# Patient Record
Sex: FEMALE | Race: WHITE | NOT HISPANIC OR LATINO | Employment: FULL TIME | URBAN - METROPOLITAN AREA
[De-identification: names, ages, dates, MRNs, and addresses within clinical notes are randomized per-mention and may not be internally consistent; named-entity substitution may affect disease eponyms.]

---

## 2021-03-23 ENCOUNTER — OFFICE VISIT (OUTPATIENT)
Dept: FAMILY MEDICINE CLINIC | Facility: CLINIC | Age: 36
End: 2021-03-23
Payer: COMMERCIAL

## 2021-03-23 VITALS
HEIGHT: 63 IN | SYSTOLIC BLOOD PRESSURE: 130 MMHG | WEIGHT: 122 LBS | RESPIRATION RATE: 18 BRPM | TEMPERATURE: 96.8 F | HEART RATE: 90 BPM | BODY MASS INDEX: 21.62 KG/M2 | OXYGEN SATURATION: 99 % | DIASTOLIC BLOOD PRESSURE: 76 MMHG

## 2021-03-23 DIAGNOSIS — Z13.6 SCREENING FOR CARDIOVASCULAR, RESPIRATORY, AND GENITOURINARY DISEASES: ICD-10-CM

## 2021-03-23 DIAGNOSIS — Z13.29 SCREENING FOR THYROID DISORDER: ICD-10-CM

## 2021-03-23 DIAGNOSIS — Z13.89 SCREENING FOR CARDIOVASCULAR, RESPIRATORY, AND GENITOURINARY DISEASES: ICD-10-CM

## 2021-03-23 DIAGNOSIS — Z13.83 SCREENING FOR CARDIOVASCULAR, RESPIRATORY, AND GENITOURINARY DISEASES: ICD-10-CM

## 2021-03-23 DIAGNOSIS — Z23 NEED FOR VACCINATION: ICD-10-CM

## 2021-03-23 DIAGNOSIS — M25.531 RIGHT WRIST PAIN: ICD-10-CM

## 2021-03-23 DIAGNOSIS — Z00.00 WELL ADULT EXAM: Primary | ICD-10-CM

## 2021-03-23 DIAGNOSIS — Z11.4 SCREENING FOR HIV (HUMAN IMMUNODEFICIENCY VIRUS): ICD-10-CM

## 2021-03-23 PROBLEM — F41.8 DEPRESSION WITH ANXIETY: Status: ACTIVE | Noted: 2021-03-23

## 2021-03-23 PROBLEM — G43.909 MIGRAINES: Status: ACTIVE | Noted: 2021-03-23

## 2021-03-23 PROCEDURE — 99385 PREV VISIT NEW AGE 18-39: CPT | Performed by: FAMILY MEDICINE

## 2021-03-23 PROCEDURE — 3725F SCREEN DEPRESSION PERFORMED: CPT | Performed by: FAMILY MEDICINE

## 2021-03-23 RX ORDER — LAMOTRIGINE 100 MG/1
TABLET ORAL DAILY
COMMUNITY
Start: 2021-03-22 | End: 2021-10-26 | Stop reason: ALTCHOICE

## 2021-03-23 RX ORDER — SUMATRIPTAN 50 MG/1
TABLET, FILM COATED ORAL AS NEEDED
COMMUNITY
Start: 2021-03-22 | End: 2021-09-28 | Stop reason: SDUPTHER

## 2021-03-23 NOTE — PROGRESS NOTES
FAMILY PRACTICE HEALTH MAINTENANCE OFFICE VISIT  [facility]    NAME: Sarah Perez  AGE: 30 y o  SEX: female  : 1985     DATE: 3/23/2021    Assessment and Plan     1  Well adult exam    2  Screening for cardiovascular, respiratory, and genitourinary diseases  -     CBC; Future  -     Comprehensive metabolic panel; Future  -     Lipid Panel with Direct LDL reflex; Future    3  Screening for thyroid disorder  -     TSH, 3rd generation; Future  -     T4, free; Future    4  Need for vaccination    5  Screening for HIV (human immunodeficiency virus)  -     HIV 1/2 w/ Reflex (Multispot); Future    6  Right wrist pain  -     Ambulatory referral to Orthopedic Surgery; Future        · Patient Counseling:   · Nutrition: Stressed importance of a well balanced diet, moderation of sodium/saturated fat, caloric balance and sufficient intake of fiber  · Exercise: Stressed the importance of regular exercise with a goal of 150 minutes per week  · Dental Health: Discussed daily flossing and brushing and regular dental visits     · Immunizations reviewed: Up To Date  · Discussed benefits of:  Screening labs   BMI Counseling: Body mass index is 21 61 kg/m²  Discussed with patient's BMI with her  The BMI is normal      No follow-ups on file  Chief Complaint     Chief Complaint   Patient presents with    Physical Exam     [id number]       History of Present Illness     HPI    Well Adult Physical   Patient here for a comprehensive physical exam   She complains of right sided wrist discomfort and numbness in her palm  Worked as a  for years so thinks it may be due to repetitive motion  Has tried OTC NSAIDs and a brace on her wrist  Would like ortho evaluation         Diet and Physical Activity  Diet: well balanced diet  Exercise: frequently      Depression Screen  PHQ-9 Depression Screening    PHQ-9:   Frequency of the following problems over the past two weeks:      Little interest or pleasure in doing things: 0 - not at all  Feeling down, depressed, or hopeless: 0 - not at all  PHQ-2 Score: 0          General Health  Hearing: Normal:  bilateral  Vision: no vision problems  Dental: regular dental visits, brushes teeth twice daily and flosses teeth occasionally    Reproductive Health  No issues , Regular Periods and Follows with gynecologist      The following portions of the patient's history were reviewed and updated as appropriate: allergies, current medications, past family history, past medical history, past social history, past surgical history and problem list     Review of Systems     Review of Systems   Constitutional: Negative  HENT: Negative  Eyes: Negative  Respiratory: Negative  Cardiovascular: Negative  Gastrointestinal: Negative  Endocrine: Negative  Genitourinary: Negative  Musculoskeletal: Negative  Skin: Negative  Allergic/Immunologic: Negative  Neurological: Negative  Hematological: Negative  Psychiatric/Behavioral: Negative  Past Medical History     No past medical history on file  Past Surgical History     No past surgical history on file      Social History     Social History     Socioeconomic History    Marital status: /Civil Union     Spouse name: None    Number of children: None    Years of education: None    Highest education level: None   Occupational History    None   Social Needs    Financial resource strain: None    Food insecurity     Worry: None     Inability: None    Transportation needs     Medical: None     Non-medical: None   Tobacco Use    Smoking status: Never Smoker    Smokeless tobacco: Never Used   Substance and Sexual Activity    Alcohol use: Not Currently    Drug use: Never    Sexual activity: None   Lifestyle    Physical activity     Days per week: None     Minutes per session: None    Stress: None   Relationships    Social connections     Talks on phone: None     Gets together: None     Attends Orthodoxy service: None     Active member of club or organization: None     Attends meetings of clubs or organizations: None     Relationship status: None    Intimate partner violence     Fear of current or ex partner: None     Emotionally abused: None     Physically abused: None     Forced sexual activity: None   Other Topics Concern    None   Social History Narrative    None       Family History     No family history on file  Current Medications       Current Outpatient Medications:     lamoTRIgine (LaMICtal) 100 mg tablet, daily, Disp: , Rfl:     SUMAtriptan (IMITREX) 50 mg tablet, as needed, Disp: , Rfl:      Allergies     No Known Allergies    Objective     /76   Pulse 90   Temp (!) 96 8 °F (36 °C)   Resp 18   Ht 5' 3" (1 6 m)   Wt 55 3 kg (122 lb)   LMP 03/09/2021 (Approximate)   SpO2 99%   BMI 21 61 kg/m²      Physical Exam  Constitutional:       General: She is not in acute distress  Appearance: She is well-developed  She is not diaphoretic  HENT:      Head: Normocephalic and atraumatic  Neck:      Musculoskeletal: Normal range of motion and neck supple  Cardiovascular:      Rate and Rhythm: Normal rate and regular rhythm  Heart sounds: Normal heart sounds  No murmur  No friction rub  No gallop  Pulmonary:      Effort: Pulmonary effort is normal  No respiratory distress  Breath sounds: Normal breath sounds  No wheezing or rales  Chest:      Chest wall: No tenderness  Abdominal:      General: Bowel sounds are normal  There is no distension  Palpations: Abdomen is soft  There is no mass  Tenderness: There is no abdominal tenderness  There is no guarding or rebound  Musculoskeletal: Normal range of motion  General: No deformity  Skin:     General: Skin is warm and dry  Neurological:      Mental Status: She is alert and oriented to person, place, and time     Psychiatric:         Behavior: Behavior normal  Thought Content:  Thought content normal          Judgment: Judgment normal             Visual Acuity Screening    Right eye Left eye Both eyes   Without correction:      With correction: 20/15 20/15 20/15           Carline Arias MD  [facility]

## 2021-03-25 ENCOUNTER — OFFICE VISIT (OUTPATIENT)
Dept: URGENT CARE | Facility: CLINIC | Age: 36
End: 2021-03-25
Payer: COMMERCIAL

## 2021-03-25 ENCOUNTER — APPOINTMENT (OUTPATIENT)
Dept: RADIOLOGY | Facility: CLINIC | Age: 36
End: 2021-03-25
Payer: COMMERCIAL

## 2021-03-25 VITALS — HEART RATE: 80 BPM | OXYGEN SATURATION: 100 % | RESPIRATION RATE: 16 BRPM | TEMPERATURE: 98.1 F

## 2021-03-25 DIAGNOSIS — M25.531 WRIST PAIN, ACUTE, RIGHT: Primary | ICD-10-CM

## 2021-03-25 DIAGNOSIS — R52 PAIN: ICD-10-CM

## 2021-03-25 PROCEDURE — 99213 OFFICE O/P EST LOW 20 MIN: CPT | Performed by: PHYSICIAN ASSISTANT

## 2021-03-25 PROCEDURE — 73110 X-RAY EXAM OF WRIST: CPT

## 2021-03-25 PROCEDURE — 73120 X-RAY EXAM OF HAND: CPT

## 2021-03-25 RX ORDER — NAPROXEN 500 MG/1
500 TABLET ORAL 2 TIMES DAILY WITH MEALS
Qty: 30 TABLET | Refills: 0 | Status: ON HOLD | OUTPATIENT
Start: 2021-03-25 | End: 2021-06-24 | Stop reason: ALTCHOICE

## 2021-03-25 NOTE — PATIENT INSTRUCTIONS
Acute R wrist pain:   -There is no obvious sign of dislocation or fracture on X-ray  Awaiting official read     -Wrist splint for comfort and support   -Ice the area for 20 minutes 3-4 times a day  -Elevate the area and rest   -You can take Advil or Tylenol for the pain  -Avoid strenuous activity/sports or gym until your symptoms improve  -Follow up with Dr Grande For further evaluation and management as scheduled

## 2021-03-25 NOTE — PROGRESS NOTES
[facility]        NAME: Rikki Anton is a 30 y o  female  : 1985    MRN: [id number]  DATE: 2021  TIME: 4:00PM    Assessment and Plan   Wrist pain, acute, right [M25 531]  1  Wrist pain, acute, right  naproxen (NAPROSYN) 500 mg tablet   2  Pain  XR wrist 3+ vw right    XR hand 2 vw right         Patient Instructions   Acute R wrist pain:   -There is no obvious sign of dislocation or fracture on X-ray  Awaiting official read  -Wrist splint for comfort and support   -Ice the area for 20 minutes 3-4 times a day  -Elevate the area and rest   -You can take Advil or Tylenol for the pain  -Avoid strenuous activity/sports or gym until your symptoms improve  -Follow up with Dr Grande For further evaluation and management as scheduled     Follow up with PCP in 3-5 days  Proceed to  ER if symptoms worsen  Chief Complaint     Chief Complaint   Patient presents with    Pain     pt presnts with right wrist/hand pain of unknown etiology; pain started one week ago         History of Present Illness       The patient presents today for a one week hx of R wrist and hand pain  She states that one week ago she woke up with acute pain of the R wrist  She states that the pain is so severe she has trouble sleeping at night  The pain ranges from 5/10-7/10 in intensity  The pain is over the lateral and medial side of the wrist  She states that the pain is a sharp/shooting pain that is non-radiating  She has full ROM of the wrist but states that there is pain with gripping  She has been taking Ibuprofen with no relief  She notes that warm compress eases the pain  She is seeing a Hand Orthopedist next week  She states that she has a hx of R wrist fracture  No acute injury or trauma  Review of Systems   Review of Systems   Constitutional: Negative for activity change, appetite change, chills, fatigue and fever  Musculoskeletal: Positive for arthralgias   Negative for back pain, gait problem, joint swelling, myalgias, neck pain and neck stiffness  Skin: Negative for color change, pallor, rash and wound  Allergic/Immunologic: Negative for immunocompromised state  Neurological: Negative for dizziness, weakness, light-headedness and numbness  Hematological: Does not bruise/bleed easily  Current Medications       Current Outpatient Medications:     lamoTRIgine (LaMICtal) 100 mg tablet, daily, Disp: , Rfl:     SUMAtriptan (IMITREX) 50 mg tablet, as needed, Disp: , Rfl:     naproxen (NAPROSYN) 500 mg tablet, Take 1 tablet (500 mg total) by mouth 2 (two) times a day with meals, Disp: 30 tablet, Rfl: 0    predniSONE 20 mg tablet, Take 1 tablet (20 mg total) by mouth 3 (three) times a day for 5 days, Disp: 15 tablet, Rfl: 0    Current Allergies     Allergies as of 03/25/2021    (No Known Allergies)            The following portions of the patient's history were reviewed and updated as appropriate: allergies, current medications, past family history, past medical history, past social history, past surgical history and problem list      Past Medical History:   Diagnosis Date    Depression     Migraines        Past Surgical History:   Procedure Laterality Date    NO PAST SURGERIES         Family History   Problem Relation Age of Onset    No Known Problems Mother     No Known Problems Father          Medications have been verified  Objective   Pulse 80   Temp 98 1 °F (36 7 °C)   Resp 16   LMP 03/09/2021 (Approximate)   SpO2 100%   Patient's last menstrual period was 03/09/2021 (approximate)  Physical Exam     Physical Exam  Vitals signs and nursing note reviewed  Constitutional:       General: She is not in acute distress  Appearance: She is well-developed  She is not diaphoretic  Cardiovascular:      Rate and Rhythm: Normal rate and regular rhythm  Heart sounds: Normal heart sounds     Pulmonary:      Effort: Pulmonary effort is normal       Breath sounds: Normal breath sounds  Musculoskeletal:      Right wrist: She exhibits tenderness  She exhibits normal range of motion, no bony tenderness, no swelling, no effusion, no crepitus and no deformity  Comments: There is mild tenderness over the diffuse right wrist  The patient has full ROM of the wrist without pain   strength is intact but she states that her pain is exacerbated with gripping  Sensation is intact  There is no erythema, edema or ecchymosis  Skin is appropriately intact and warm  Skin:     General: Skin is warm and dry  Capillary Refill: Capillary refill takes less than 2 seconds  Findings: No bruising, ecchymosis or erythema  Neurological:      General: No focal deficit present  Mental Status: She is alert and oriented to person, place, and time  Sensory: Sensation is intact  No sensory deficit  Motor: No weakness or abnormal muscle tone        Gait: Gait normal    Psychiatric:         Behavior: Behavior normal

## 2021-03-26 PROBLEM — M25.531 WRIST PAIN, ACUTE, RIGHT: Status: ACTIVE | Noted: 2021-03-26

## 2021-03-31 ENCOUNTER — OFFICE VISIT (OUTPATIENT)
Dept: OBGYN CLINIC | Facility: CLINIC | Age: 36
End: 2021-03-31
Payer: COMMERCIAL

## 2021-03-31 VITALS
HEART RATE: 101 BPM | DIASTOLIC BLOOD PRESSURE: 90 MMHG | WEIGHT: 127.8 LBS | HEIGHT: 63 IN | SYSTOLIC BLOOD PRESSURE: 139 MMHG | BODY MASS INDEX: 22.64 KG/M2

## 2021-03-31 DIAGNOSIS — G56.21 CUBITAL TUNNEL SYNDROME ON RIGHT: ICD-10-CM

## 2021-03-31 DIAGNOSIS — I73.00 RAYNAUD'S DISEASE WITHOUT GANGRENE: Primary | ICD-10-CM

## 2021-03-31 DIAGNOSIS — M25.531 RIGHT WRIST PAIN: ICD-10-CM

## 2021-03-31 PROCEDURE — 3008F BODY MASS INDEX DOCD: CPT | Performed by: ORTHOPAEDIC SURGERY

## 2021-03-31 PROCEDURE — 99204 OFFICE O/P NEW MOD 45 MIN: CPT | Performed by: ORTHOPAEDIC SURGERY

## 2021-03-31 PROCEDURE — 1036F TOBACCO NON-USER: CPT | Performed by: ORTHOPAEDIC SURGERY

## 2021-03-31 NOTE — PROGRESS NOTES
Assessment/Plan:  1  Raynaud's disease without gangrene  Ambulatory referral to Vascular Surgery   2  Right wrist pain  Ambulatory referral to Orthopedic Surgery   3  Cubital tunnel syndrome on right  EMG 1 Limb     The patient does appear to have cubital tunnel syndrome of the right upper extremity with weakness and numbness of the ulnar nerve distribution  We are ordering an EMG to further evaluate this  We also advised tying to keep her elbow straight, especially when sleeping, to take pressure off of the ulnar nerve  She also appears to have chronic raynauds syndrome  We are referring her to vascular surgery for this  The may be part of a rheumatologic condition as the patient does have a family history of rheumatoid arthritis, but has not been worked up for this  We may consider a rheumatology referral in the future, pending evaluation by vascular surgery  She will follow-up after her EMG to discuss the results and how to proceed  Subjective: Bonnie Oneal is a 30 y o  female who presents today for evaluation of her right upper extremity  She has had intermittent issues with her hand and wrist, however about 3 weeks ago she started with ulnar sided wrist and hand pain and as well as numbness into the little and ring fingers  She notes that her pain seems to get worse at the end of the day  She notes that some times her lateral hand seems to get blue and swollen at times  Both her hands are chronically cold and stiff and she often wears compression type gloves on her hands for this  Review of Systems   Constitutional: Negative  Negative for chills and fever  HENT: Negative  Negative for ear pain and sore throat  Eyes: Negative  Negative for pain and redness  Respiratory: Negative  Negative for shortness of breath and wheezing  Cardiovascular: Negative for chest pain and palpitations  Gastrointestinal: Negative  Negative for abdominal pain and blood in stool  Endocrine: Negative  Negative for polydipsia and polyuria  Genitourinary: Negative  Negative for difficulty urinating and dysuria  Musculoskeletal:        As noted in HPI   Skin: Negative  Negative for pallor and rash  Neurological: Positive for numbness  Negative for dizziness  Hematological: Negative  Negative for adenopathy  Does not bruise/bleed easily  Psychiatric/Behavioral: Negative  Negative for confusion and suicidal ideas  Past Medical History:   Diagnosis Date    Depression     Migraines        Past Surgical History:   Procedure Laterality Date    NO PAST SURGERIES         Family History   Problem Relation Age of Onset    No Known Problems Mother     No Known Problems Father        Social History     Occupational History    Not on file   Tobacco Use    Smoking status: Never Smoker    Smokeless tobacco: Never Used   Substance and Sexual Activity    Alcohol use: Not Currently    Drug use: Never    Sexual activity: Not on file         Current Outpatient Medications:     lamoTRIgine (LaMICtal) 100 mg tablet, daily, Disp: , Rfl:     naproxen (NAPROSYN) 500 mg tablet, Take 1 tablet (500 mg total) by mouth 2 (two) times a day with meals, Disp: 30 tablet, Rfl: 0    SUMAtriptan (IMITREX) 50 mg tablet, as needed, Disp: , Rfl:     No Known Allergies    Objective:  Vitals:    03/31/21 0820   BP: 139/90   Pulse: 101       Right Hand Exam     Tenderness   Right hand tenderness location: Mild tenderness ulnar wrist     Range of Motion   Wrist   Extension: normal   Flexion: normal   Pronation: normal   Supination: normal     Other   Erythema: absent  Right hand sensation: Paresthesias ulnar nerve distribution  Right wrist pulse absent: 2+ radial pulse, 1+ ulnar pulse  Comments:  Slow pinking of hand with both radial ulnar artery allens testing  Positive Tinels ulnar nerve at elbow  4/5 strength first dorsal interosseous              Physical Exam  Constitutional:       General: She is not in acute distress  Appearance: She is well-developed  HENT:      Head: Normocephalic and atraumatic  Eyes:      General: No scleral icterus  Conjunctiva/sclera: Conjunctivae normal    Neck:      Vascular: No JVD  Cardiovascular:      Rate and Rhythm: Normal rate  Pulmonary:      Effort: Pulmonary effort is normal  No respiratory distress  Skin:     General: Skin is warm  Neurological:      Mental Status: She is alert and oriented to person, place, and time  Coordination: Coordination normal          I have personally reviewed pertinent films in PACS and my interpretation is as follows:  Xrays right hand and wrist from 3/25/21: Negative

## 2021-04-15 LAB
ALBUMIN SERPL-MCNC: 4.5 G/DL (ref 3.8–4.8)
ALBUMIN/GLOB SERPL: 1.8 {RATIO} (ref 1.2–2.2)
ALP SERPL-CCNC: 47 IU/L (ref 39–117)
ALT SERPL-CCNC: 9 IU/L (ref 0–32)
AST SERPL-CCNC: 18 IU/L (ref 0–40)
BASOPHILS # BLD AUTO: 0.1 X10E3/UL (ref 0–0.2)
BASOPHILS NFR BLD AUTO: 1 %
BILIRUB SERPL-MCNC: 0.4 MG/DL (ref 0–1.2)
BUN SERPL-MCNC: 15 MG/DL (ref 6–20)
BUN/CREAT SERPL: 22 (ref 9–23)
CALCIUM SERPL-MCNC: 9.7 MG/DL (ref 8.7–10.2)
CHLORIDE SERPL-SCNC: 101 MMOL/L (ref 96–106)
CHOLEST SERPL-MCNC: 202 MG/DL (ref 100–199)
CO2 SERPL-SCNC: 25 MMOL/L (ref 20–29)
CREAT SERPL-MCNC: 0.68 MG/DL (ref 0.57–1)
EOSINOPHIL # BLD AUTO: 0.1 X10E3/UL (ref 0–0.4)
EOSINOPHIL NFR BLD AUTO: 2 %
ERYTHROCYTE [DISTWIDTH] IN BLOOD BY AUTOMATED COUNT: 11.8 % (ref 11.7–15.4)
GLOBULIN SER-MCNC: 2.5 G/DL (ref 1.5–4.5)
GLUCOSE SERPL-MCNC: 87 MG/DL (ref 65–99)
HCT VFR BLD AUTO: 37.6 % (ref 34–46.6)
HDLC SERPL-MCNC: 78 MG/DL
HGB BLD-MCNC: 12.1 G/DL (ref 11.1–15.9)
HIV 1+2 AB+HIV1 P24 AG SERPL QL IA: NON REACTIVE
IMM GRANULOCYTES # BLD: 0 X10E3/UL (ref 0–0.1)
IMM GRANULOCYTES NFR BLD: 0 %
LDLC SERPL CALC-MCNC: 111 MG/DL (ref 0–99)
LYMPHOCYTES # BLD AUTO: 2.4 X10E3/UL (ref 0.7–3.1)
LYMPHOCYTES NFR BLD AUTO: 36 %
MCH RBC QN AUTO: 29.9 PG (ref 26.6–33)
MCHC RBC AUTO-ENTMCNC: 32.2 G/DL (ref 31.5–35.7)
MCV RBC AUTO: 93 FL (ref 79–97)
MICRODELETION SYND BLD/T FISH: NORMAL
MONOCYTES # BLD AUTO: 0.4 X10E3/UL (ref 0.1–0.9)
MONOCYTES NFR BLD AUTO: 6 %
NEUTROPHILS # BLD AUTO: 3.6 X10E3/UL (ref 1.4–7)
NEUTROPHILS NFR BLD AUTO: 55 %
PLATELET # BLD AUTO: 318 X10E3/UL (ref 150–450)
POTASSIUM SERPL-SCNC: 4.5 MMOL/L (ref 3.5–5.2)
PROT SERPL-MCNC: 7 G/DL (ref 6–8.5)
RBC # BLD AUTO: 4.05 X10E6/UL (ref 3.77–5.28)
SL AMB EGFR AFRICAN AMERICAN: 132 ML/MIN/1.73
SL AMB EGFR NON AFRICAN AMERICAN: 114 ML/MIN/1.73
SODIUM SERPL-SCNC: 140 MMOL/L (ref 134–144)
T4 FREE SERPL-MCNC: 1.39 NG/DL (ref 0.82–1.77)
TRIGL SERPL-MCNC: 70 MG/DL (ref 0–149)
TSH SERPL DL<=0.005 MIU/L-ACNC: 1.4 UIU/ML (ref 0.45–4.5)
WBC # BLD AUTO: 6.6 X10E3/UL (ref 3.4–10.8)

## 2021-05-04 ENCOUNTER — OFFICE VISIT (OUTPATIENT)
Dept: OBGYN CLINIC | Facility: CLINIC | Age: 36
End: 2021-05-04
Payer: COMMERCIAL

## 2021-05-04 VITALS
HEART RATE: 93 BPM | HEIGHT: 63 IN | WEIGHT: 127 LBS | BODY MASS INDEX: 22.5 KG/M2 | SYSTOLIC BLOOD PRESSURE: 127 MMHG | DIASTOLIC BLOOD PRESSURE: 80 MMHG

## 2021-05-04 DIAGNOSIS — G56.21 CUBITAL TUNNEL SYNDROME ON RIGHT: Primary | ICD-10-CM

## 2021-05-04 PROCEDURE — 99214 OFFICE O/P EST MOD 30 MIN: CPT | Performed by: ORTHOPAEDIC SURGERY

## 2021-05-04 RX ORDER — PREDNISONE 20 MG/1
20 TABLET ORAL 3 TIMES DAILY
Qty: 15 TABLET | Refills: 0 | Status: SHIPPED | OUTPATIENT
Start: 2021-05-04 | End: 2021-05-09

## 2021-05-04 NOTE — PROGRESS NOTES
Assessment/Plan:  1  Cubital tunnel syndrome on right       The patient's symptoms and examination continue to be consistent with cubital tunnel syndrome  I do not appreciate any mass of this region  The earliest EMG appointment is in June  For now, we did provide her with a script for prednisone for 5 days, to hopefully help her symptoms  We will see her back after her EMG to discuss the results and possible cubital tunnel release  Subjective: Kristin Nunez is a 30 y o  female who presents today for follow-up of her right upper extremity  We had ordered an EMG last visit to rule out cubital tunnel syndrome, however this is not scheduled until June  She notes that her symptoms are getting worse and she is very distraught about this  She complains of ongoing medial elbow pain , as well as pain, burning, and numbness about the ulnar nerve distribution of the right upper extremity  This is worse when she is typing on her computer for work  She is inquiring about disability for work and also for something to help her with pain  Review of Systems   Constitutional: Negative  Negative for chills and fever  HENT: Negative  Negative for ear pain and sore throat  Eyes: Negative  Negative for pain and redness  Respiratory: Negative  Negative for shortness of breath and wheezing  Cardiovascular: Negative for chest pain and palpitations  Gastrointestinal: Negative  Negative for abdominal pain and blood in stool  Endocrine: Negative  Negative for polydipsia and polyuria  Genitourinary: Negative  Negative for difficulty urinating and dysuria  Musculoskeletal:        As noted in HPI   Skin: Negative  Negative for pallor and rash  Neurological: Positive for numbness  Negative for dizziness  Hematological: Negative  Negative for adenopathy  Does not bruise/bleed easily  Psychiatric/Behavioral: Negative  Negative for confusion and suicidal ideas           Past Medical History:   Diagnosis Date  Depression     Migraines        Past Surgical History:   Procedure Laterality Date    NO PAST SURGERIES         Family History   Problem Relation Age of Onset    No Known Problems Mother     No Known Problems Father        Social History     Occupational History    Not on file   Tobacco Use    Smoking status: Never Smoker    Smokeless tobacco: Never Used   Substance and Sexual Activity    Alcohol use: Not Currently    Drug use: Never    Sexual activity: Not on file         Current Outpatient Medications:     lamoTRIgine (LaMICtal) 100 mg tablet, daily, Disp: , Rfl:     naproxen (NAPROSYN) 500 mg tablet, Take 1 tablet (500 mg total) by mouth 2 (two) times a day with meals, Disp: 30 tablet, Rfl: 0    SUMAtriptan (IMITREX) 50 mg tablet, as needed, Disp: , Rfl:     No Known Allergies    Objective:  Vitals:    05/04/21 1038   BP: 127/80   Pulse: 93       Left Elbow Exam     Tenderness   Left elbow tenderness location: tenderness ulnar nerve  Range of Motion   The patient has normal left elbow ROM  Tests   Tinel's sign (cubital tunnel): positive    Other   Erythema: absent  Sensation: normal  Pulse: present    Comments:  4/5 strength first dorsal interosseous  No muscle wasting appreciated  No mass about the medial elbow appreciated  Physical Exam  Constitutional:       General: She is not in acute distress  Appearance: She is well-developed  HENT:      Head: Normocephalic and atraumatic  Eyes:      General: No scleral icterus  Conjunctiva/sclera: Conjunctivae normal    Neck:      Vascular: No JVD  Cardiovascular:      Rate and Rhythm: Normal rate  Pulmonary:      Effort: Pulmonary effort is normal  No respiratory distress  Skin:     General: Skin is warm  Neurological:      Mental Status: She is alert and oriented to person, place, and time        Coordination: Coordination normal

## 2021-05-07 ENCOUNTER — CONSULT (OUTPATIENT)
Dept: VASCULAR SURGERY | Facility: CLINIC | Age: 36
End: 2021-05-07
Payer: COMMERCIAL

## 2021-05-07 VITALS
WEIGHT: 125 LBS | DIASTOLIC BLOOD PRESSURE: 76 MMHG | BODY MASS INDEX: 22.15 KG/M2 | HEIGHT: 63 IN | TEMPERATURE: 98.6 F | SYSTOLIC BLOOD PRESSURE: 124 MMHG | HEART RATE: 88 BPM

## 2021-05-07 DIAGNOSIS — I73.00 RAYNAUD'S DISEASE WITHOUT GANGRENE: ICD-10-CM

## 2021-05-07 PROCEDURE — 99204 OFFICE O/P NEW MOD 45 MIN: CPT | Performed by: PHYSICIAN ASSISTANT

## 2021-05-07 NOTE — PROGRESS NOTES
Assessment/Plan:    Raynaud's disease without gangrene  Neuropathy  Family history of Raynaud's and rheumatoid arthritis  -     Ambulatory referral to Vascular Surgery      -chronic coldness to the digits the hands and feet with red white and blue discoloration  -symptoms worse in the winter and with stress  -migraines for which she uses abortive constrictors  -family history of Raynaud's and RA    Plan:  -patient education provided on conservative measures for treatment of Raynaud's  We discussed the pathophysiology of Raynauds  -she has no tissue damage and her extremities are warm and well-perfused with normal pulses  -we discussed the importance of monitoring her extremities and keeping her hands and feet warm  -she should avoid potential triggers   -good heart healthy diet and maintain healthy weight  -consider  calcium channel blocker-dihydropyrinide if needed for symptom relief   -talk to neurology / rheum abortive migraine medication which may exacerbate Raynaud's  -Rheumatology consult evaluation to rule out underlying rheumatologic disorder   -follow up in 6 months or as needed          Subjective:      Patient ID: Mike Naranjo is a 30 y o  female  New patient, referred for Raynaud's disease  She reports experiencing extreme pain, cold and numbness to b/l hands and feet  Presents today for further evaluation  HPI    Mike Naranjo is a 30 y o  female with depression, anxiety and migraines who recently was found to have cubital tunnel syndrome and wearing a brace to the right wrist   She comes in for evaluation of chronic coldness to the hands and feet associated with intermittent discoloration with red, white and blue digits  She reports that her symptoms have been worsening and have come to the point where it is not bearable due to severe coldness and pain  Her symptoms are worsened in the winter as well as during times of stress  She has difficulty putting her hands in the freezer    She also has a history of migraines which interestingly are worse during the winter  She reports that she may require 5-6 abortive treatments monthly during the winter  She denies chronic joint problems  She works for [employer] in [email or web address]  She is a lifelong nonsmoker  She has no personal history of rheumatologic disorders  However, mom has Raynaud's, rheumatoid arthritis and fibromyalgia  Her father has renal failure on dialysis  The following portions of the patient's history were reviewed and updated as appropriate: allergies, current medications, past family history, past medical history, past social history, past surgical history and problem list     Review of Systems   Constitutional: Negative  HENT: Negative  Eyes: Negative  Respiratory: Negative  Cardiovascular: Negative  Gastrointestinal: Negative  Endocrine: Positive for cold intolerance  Genitourinary: Negative  Musculoskeletal: Positive for joint swelling, myalgias and neck pain  Skin: Negative  Allergic/Immunologic: Negative  Neurological: Negative  Hematological: Negative  Psychiatric/Behavioral: Negative  Depression         Objective:      /76 (BP Location: Right arm, Patient Position: Sitting)   Pulse 88   Temp 98 6 °F (37 °C) (Tympanic)   Ht 5' 3" (1 6 m)   Wt 56 7 kg (125 lb)   BMI 22 14 kg/m²       R wrist brace   Physical Exam  Vitals signs and nursing note reviewed  Constitutional:       Appearance: She is well-developed  HENT:      Head: Normocephalic and atraumatic  Eyes:      Pupils: Pupils are equal, round, and reactive to light  Neck:      Musculoskeletal: Neck supple  Thyroid: No thyromegaly  Vascular: No JVD  Trachea: Trachea normal    Cardiovascular:      Rate and Rhythm: Normal rate and regular rhythm  Pulses:           Carotid pulses are 2+ on the right side and 2+ on the left side         Radial pulses are 2+ on the right side and 2+ on the left side  Dorsalis pedis pulses are 2+ on the right side and 2+ on the left side  Heart sounds: Normal heart sounds, S1 normal and S2 normal  No murmur  No friction rub  No gallop  Pulmonary:      Effort: Pulmonary effort is normal  No accessory muscle usage or respiratory distress  Breath sounds: Normal breath sounds  No wheezing or rales  Abdominal:      General: Bowel sounds are normal  There is no distension  Palpations: Abdomen is soft  Tenderness: There is no abdominal tenderness  Musculoskeletal: Normal range of motion  General: No deformity  Skin:     General: Skin is warm and dry  Findings: No lesion or rash  Nails: There is no clubbing  Neurological:      Mental Status: She is alert and oriented to person, place, and time  Comments: Grossly normal    Psychiatric:         Behavior: Behavior is cooperative  I have reviewed and made appropriate changes to the review of systems input by the medical assistant      Vitals:    05/07/21 1431   BP: 124/76   BP Location: Right arm   Patient Position: Sitting   Pulse: 88   Temp: 98 6 °F (37 °C)   TempSrc: Tympanic   Weight: 56 7 kg (125 lb)   Height: 5' 3" (1 6 m)       Patient Active Problem List   Diagnosis    Depression with anxiety    Migraines    Wrist pain, acute, right       Past Surgical History:   Procedure Laterality Date    NO PAST SURGERIES         Family History   Problem Relation Age of Onset    No Known Problems Mother     No Known Problems Father        Social History     Socioeconomic History    Marital status: /Civil Union     Spouse name: Not on file    Number of children: Not on file    Years of education: Not on file    Highest education level: Not on file   Occupational History    Not on file   Social Needs    Financial resource strain: Not on file    Food insecurity     Worry: Not on file     Inability: Not on file   XZERES needs Medical: Not on file     Non-medical: Not on file   Tobacco Use    Smoking status: Never Smoker    Smokeless tobacco: Never Used   Substance and Sexual Activity    Alcohol use: Not Currently    Drug use: Never    Sexual activity: Not on file   Lifestyle    Physical activity     Days per week: Not on file     Minutes per session: Not on file    Stress: Not on file   Relationships    Social connections     Talks on phone: Not on file     Gets together: Not on file     Attends Oriental orthodox service: Not on file     Active member of club or organization: Not on file     Attends meetings of clubs or organizations: Not on file     Relationship status: Not on file    Intimate partner violence     Fear of current or ex partner: Not on file     Emotionally abused: Not on file     Physically abused: Not on file     Forced sexual activity: Not on file   Other Topics Concern    Not on file   Social History Narrative    Not on file       No Known Allergies      Current Outpatient Medications:     lamoTRIgine (LaMICtal) 100 mg tablet, daily, Disp: , Rfl:     predniSONE 20 mg tablet, Take 1 tablet (20 mg total) by mouth 3 (three) times a day for 5 days, Disp: 15 tablet, Rfl: 0    naproxen (NAPROSYN) 500 mg tablet, Take 1 tablet (500 mg total) by mouth 2 (two) times a day with meals, Disp: 30 tablet, Rfl: 0    SUMAtriptan (IMITREX) 50 mg tablet, as needed, Disp: , Rfl:

## 2021-05-07 NOTE — PATIENT INSTRUCTIONS
Raynaud's  Neuropathy    Keep hands and feet warm  Wear gloves  Soak hands in warm water  Avoid potential triggers   Talk to neurology / rheum abortive migraine medication  Rheumatology evaluation          Raynaud Disease   WHAT YOU NEED TO KNOW:   Raynaud disease is a disorder that affects blood circulation, usually in the hands and feet  This disorder causes the arteries (blood vessels) that carry blood to your fingers, toes, ears, or nose to tighten  This is often triggered by cold or emotional stress  The decrease in blood flow causes lack of oxygen and changes in skin color  Over time, ulcers or gangrene (tissue death) may develop if frequent or severe attacks are not prevented  Raynaud disease can be primary or secondary  Primary means it has no clear cause  Secondary means it has a cause and may be related to another medical condition you have  DISCHARGE INSTRUCTIONS:   Follow up with your healthcare provider as directed:  Write down your questions so you remember to ask them during your visits  Skin care:   · Avoid putting too much pressure on your fingertips, such as typing or playing the piano  This kind of pressure may cause your blood vessels to narrow and trigger an attack  · Check your feet and hands daily for numb areas, thinning or thickening skin, black spots, cracks, brittle nails, or ulcers  · Keep your skin clean and dry to prevent an infection  Use lotion that contains lanolin on your hands and feet to keep the skin from drying or cracking  Prevent a Raynaud disease attack:   · Avoid cold temperatures when possible:  Wear gloves, scarves, or other winter garments during the winter months or before you go into cold rooms  · Limit alcohol and caffeine:  Men should limit alcohol to 2 drinks a day  Women should limit alcohol to 1 drink a day  A drink is 12 ounces of beer, 5 ounces of wine, or 1½ ounces of liquor  Try drinking decaffeinated coffee, tea, or soda   Ask your healthcare provider for more information about alcohol and caffeine  · Use caution with medicines:  Talk to your healthcare provider before you use medicines that may trigger an attack  These include certain medicines used for treating high blood pressure, headaches, cancer, or colds  · Exercise regularly: This prevents narrowing of the blood vessels and increases blood flow in your body  · Learn to manage stress:  Stress may trigger an attack  Try new ways to relax, such as deep breathing, meditation, or biofeedback  Biofeedback is a way to control how your body reacts to stress or pain  · Stop smoking:  If you smoke, it is never too late to quit  Smoking causes blood vessels to narrow and may trigger an attack  Ask your healthcare provider for information if you need help quitting  What to do during a Raynaud disease attack:   · Get inside to warm yourself  · Wiggle your fingers or toes, or swing your arms around to increase circulation  Massage the affected parts  · Place your hands under your armpits or run warm water over the affected area  Do not  place the affected part in direct contact with hot water or a hot water bottle  The affected parts could be injured if they are not able to feel that the water is hot  · Get yourself out of stressful situations if possible  Deep breathing, meditation, or biofeedback may help decrease stress  Contact your healthcare provider if:   · You have new symptoms since your last appointment  · Your symptoms prevent you from doing your daily activities  · You need help to quit smoking  · You have questions or concerns about your condition or care  Return to the emergency department if:   · You have many attacks even if you prevent cold, stress, or other triggers  · You have pain in your fingers or toes that does not go away or gets worse      · You have sores or ulcers on the tips of your fingers or toes that do not heal     · You have black spots on your fingers or toes  · Your hands or feet remain cold or discolored even after you warm them  © Copyright [address] Information is for End User's use only and may not be sold, redistributed or otherwise used for commercial purposes  All illustrations and images included in CareNotes® are the copyrighted property of A D A M , Inc  or [facility] [place] [facility] [place]   The above information is an  only  It is not intended as medical advice for individual conditions or treatments  Talk to your doctor, nurse or pharmacist before following any medical regimen to see if it is safe and effective for you

## 2021-05-17 ENCOUNTER — TELEPHONE (OUTPATIENT)
Dept: NEUROLOGY | Facility: CLINIC | Age: 36
End: 2021-05-17

## 2021-05-20 ENCOUNTER — PROCEDURE VISIT (OUTPATIENT)
Dept: NEUROLOGY | Facility: CLINIC | Age: 36
End: 2021-05-20
Payer: COMMERCIAL

## 2021-05-20 DIAGNOSIS — G56.21 CUBITAL TUNNEL SYNDROME ON RIGHT: ICD-10-CM

## 2021-05-20 PROCEDURE — 95886 MUSC TEST DONE W/N TEST COMP: CPT | Performed by: PHYSICAL MEDICINE & REHABILITATION

## 2021-05-20 PROCEDURE — 95909 NRV CNDJ TST 5-6 STUDIES: CPT | Performed by: PHYSICAL MEDICINE & REHABILITATION

## 2021-05-20 NOTE — PROGRESS NOTES
EMG 1 Limb     Date/Time 5/20/2021 2:05 PM     Performed by  Noy Amaro MD     Authorized by [facility], [facility]      Universal Protocol   Consent: Verbal consent obtained  Risks and benefits: risks, benefits and alternatives were discussed  Consent given by: patient  Patient understanding: patient states understanding of the procedure being performed  Patient consent: the patient's understanding of the procedure matches consent given               EMG RIGHT UPPER EXTREMITY    61-year-old female presents with complaints of right wrist and hand pain mainly on the ulnar side of the wrist associated with numbness and tingling in the little and ring fingers for the last 3 months  Denies any radicular symptoms  Patient is being evaluated for focal neuropathy  On physical examination, motor strength is 5/5 throughout  Sensations are intact to light touch and pinprick in the median, radial and ulnar nerve distribution  Motor and sensory conduction studies were performed on the right median and ulnar nerves  The distal motor latencies were normal  The motor action potential amplitudes were normal  Motor conduction velocities were normal including conduction velocity of the ulnar nerve across the elbow  Median and ulnar F waves were normal     Median ,   radial and ulnar sensory action potential was normal     Concentric needle EMG was performed on various distal and proximal muscles of the right upper extremity including APB, FDI, pronator teres, deltoid, biceps, triceps and low cervical paraspinal region  There was no evidence of active denervation in any of the muscles tested  The compound motor unit action potentials were of normal configuration with interference patterns being full or full for effort  IMPRESSION: Normal study  WAYNE Varghese

## 2021-06-08 ENCOUNTER — OFFICE VISIT (OUTPATIENT)
Dept: OBGYN CLINIC | Facility: CLINIC | Age: 36
End: 2021-06-08
Payer: COMMERCIAL

## 2021-06-08 VITALS
SYSTOLIC BLOOD PRESSURE: 134 MMHG | BODY MASS INDEX: 22.15 KG/M2 | HEART RATE: 109 BPM | WEIGHT: 125 LBS | HEIGHT: 63 IN | DIASTOLIC BLOOD PRESSURE: 85 MMHG

## 2021-06-08 DIAGNOSIS — G56.21 CUBITAL TUNNEL SYNDROME ON RIGHT: Primary | ICD-10-CM

## 2021-06-08 PROCEDURE — 99214 OFFICE O/P EST MOD 30 MIN: CPT | Performed by: ORTHOPAEDIC SURGERY

## 2021-06-08 PROCEDURE — 3008F BODY MASS INDEX DOCD: CPT | Performed by: ORTHOPAEDIC SURGERY

## 2021-06-08 PROCEDURE — 1036F TOBACCO NON-USER: CPT | Performed by: ORTHOPAEDIC SURGERY

## 2021-06-08 NOTE — PROGRESS NOTES
Assessment/Plan:  1  Cubital tunnel syndrome on right       Despite a normal EMG, the patient does have symptoms and examination consistent with cubital tunnel syndrome  We discussed that EMG can miss this diagnosis about 30% of the time  Given the chronicity and severity of her problem, we do suggest an ulnar nerve transposition at the elbow  The patient would like to proceed with this surgery  We discussed the procedure and risks at length, including but not limited to, infection, bleeding, wound issues, nerve injury, blood clot, stiffness, failure of procedure, and need for additional surgery  We will see the patient back at the time of surgery  She will require pre-op COVID testing  Subjective: Kristin Nunez is a 29 y o  female who presents today for follow-up of her right upper extremity  She notes ongoing pain about the medial elbow as well as paresthesias and burning about the little and ring fingers  She notes good sensation about the rest of the hand  She did have her EMG, which we ordered to rule out cubital tunnel syndrome, however this was normal by report  She notes she has had mild symptoms for a couple years, but her symptoms have been rather severe for the last 6 months  Review of Systems   Constitutional: Negative  Negative for chills and fever  HENT: Negative  Negative for ear pain and sore throat  Eyes: Negative  Negative for pain and redness  Respiratory: Negative  Negative for shortness of breath and wheezing  Cardiovascular: Negative for chest pain and palpitations  Gastrointestinal: Negative  Negative for abdominal pain and blood in stool  Endocrine: Negative  Negative for polydipsia and polyuria  Genitourinary: Negative  Negative for difficulty urinating and dysuria  Musculoskeletal:        As noted in HPI   Skin: Negative  Negative for pallor and rash  Neurological: Positive for numbness  Negative for dizziness  Hematological: Negative    Negative for adenopathy  Does not bruise/bleed easily  Psychiatric/Behavioral: Negative  Negative for confusion and suicidal ideas  Past Medical History:   Diagnosis Date    Depression     Migraines        Past Surgical History:   Procedure Laterality Date    NO PAST SURGERIES         Family History   Problem Relation Age of Onset    No Known Problems Mother     No Known Problems Father        Social History     Occupational History    Not on file   Tobacco Use    Smoking status: Never Smoker    Smokeless tobacco: Never Used   Substance and Sexual Activity    Alcohol use: Not Currently    Drug use: Never    Sexual activity: Not on file         Current Outpatient Medications:     lamoTRIgine (LaMICtal) 100 mg tablet, daily, Disp: , Rfl:     naproxen (NAPROSYN) 500 mg tablet, Take 1 tablet (500 mg total) by mouth 2 (two) times a day with meals, Disp: 30 tablet, Rfl: 0    SUMAtriptan (IMITREX) 50 mg tablet, as needed, Disp: , Rfl:     No Known Allergies    Objective:  Vitals:    06/08/21 0835   BP: 134/85   Pulse: (!) 109       Left Elbow Exam     Tenderness   Left elbow tenderness location: cubital tunnel tenderness  Positive Tinels ulnar nerve  Range of Motion   Extension: normal   Flexion: normal Left elbow flexion: with disomfort medially  Pronation: normal   Supination: normal     Tests   Tinel's sign (cubital tunnel): positive    Other   Erythema: absent  Left elbow sensation: paresthesias ulnar nerve distribution  4/5 strength first dorsla interosseous  Pulse: present            Physical Exam  Constitutional:       General: She is not in acute distress  Appearance: She is well-developed  HENT:      Head: Normocephalic and atraumatic  Eyes:      General: No scleral icterus  Conjunctiva/sclera: Conjunctivae normal    Neck:      Vascular: No JVD  Cardiovascular:      Rate and Rhythm: Normal rate  Heart sounds: Normal heart sounds     Pulmonary:      Effort: Pulmonary effort is normal  No respiratory distress  Breath sounds: Normal breath sounds  Skin:     General: Skin is warm  Neurological:      Mental Status: She is alert and oriented to person, place, and time        Coordination: Coordination normal

## 2021-06-08 NOTE — H&P (VIEW-ONLY)
Assessment/Plan:  1  Cubital tunnel syndrome on right       Despite a normal EMG, the patient does have symptoms and examination consistent with cubital tunnel syndrome  We discussed that EMG can miss this diagnosis about 30% of the time  Given the chronicity and severity of her problem, we do suggest an ulnar nerve transposition at the elbow  The patient would like to proceed with this surgery  We discussed the procedure and risks at length, including but not limited to, infection, bleeding, wound issues, nerve injury, blood clot, stiffness, failure of procedure, and need for additional surgery  We will see the patient back at the time of surgery  She will require pre-op COVID testing  Subjective: Suzanne Newman is a 29 y o  female who presents today for follow-up of her right upper extremity  She notes ongoing pain about the medial elbow as well as paresthesias and burning about the little and ring fingers  She notes good sensation about the rest of the hand  She did have her EMG, which we ordered to rule out cubital tunnel syndrome, however this was normal by report  She notes she has had mild symptoms for a couple years, but her symptoms have been rather severe for the last 6 months  Review of Systems   Constitutional: Negative  Negative for chills and fever  HENT: Negative  Negative for ear pain and sore throat  Eyes: Negative  Negative for pain and redness  Respiratory: Negative  Negative for shortness of breath and wheezing  Cardiovascular: Negative for chest pain and palpitations  Gastrointestinal: Negative  Negative for abdominal pain and blood in stool  Endocrine: Negative  Negative for polydipsia and polyuria  Genitourinary: Negative  Negative for difficulty urinating and dysuria  Musculoskeletal:        As noted in HPI   Skin: Negative  Negative for pallor and rash  Neurological: Positive for numbness  Negative for dizziness  Hematological: Negative    Negative for adenopathy  Does not bruise/bleed easily  Psychiatric/Behavioral: Negative  Negative for confusion and suicidal ideas  Past Medical History:   Diagnosis Date    Depression     Migraines        Past Surgical History:   Procedure Laterality Date    NO PAST SURGERIES         Family History   Problem Relation Age of Onset    No Known Problems Mother     No Known Problems Father        Social History     Occupational History    Not on file   Tobacco Use    Smoking status: Never Smoker    Smokeless tobacco: Never Used   Substance and Sexual Activity    Alcohol use: Not Currently    Drug use: Never    Sexual activity: Not on file         Current Outpatient Medications:     lamoTRIgine (LaMICtal) 100 mg tablet, daily, Disp: , Rfl:     naproxen (NAPROSYN) 500 mg tablet, Take 1 tablet (500 mg total) by mouth 2 (two) times a day with meals, Disp: 30 tablet, Rfl: 0    SUMAtriptan (IMITREX) 50 mg tablet, as needed, Disp: , Rfl:     No Known Allergies    Objective:  Vitals:    06/08/21 0835   BP: 134/85   Pulse: (!) 109       Left Elbow Exam     Tenderness   Left elbow tenderness location: cubital tunnel tenderness  Positive Tinels ulnar nerve  Range of Motion   Extension: normal   Flexion: normal Left elbow flexion: with disomfort medially  Pronation: normal   Supination: normal     Tests   Tinel's sign (cubital tunnel): positive    Other   Erythema: absent  Left elbow sensation: paresthesias ulnar nerve distribution  4/5 strength first dorsla interosseous  Pulse: present            Physical Exam  Constitutional:       General: She is not in acute distress  Appearance: She is well-developed  HENT:      Head: Normocephalic and atraumatic  Eyes:      General: No scleral icterus  Conjunctiva/sclera: Conjunctivae normal    Neck:      Vascular: No JVD  Cardiovascular:      Rate and Rhythm: Normal rate  Heart sounds: Normal heart sounds     Pulmonary:      Effort: Pulmonary effort is normal  No respiratory distress  Breath sounds: Normal breath sounds  Skin:     General: Skin is warm  Neurological:      Mental Status: She is alert and oriented to person, place, and time        Coordination: Coordination normal

## 2021-06-10 ENCOUNTER — TELEPHONE (OUTPATIENT)
Dept: OBGYN CLINIC | Facility: CLINIC | Age: 36
End: 2021-06-10

## 2021-06-11 ENCOUNTER — TELEPHONE (OUTPATIENT)
Dept: OBGYN CLINIC | Facility: HOSPITAL | Age: 36
End: 2021-06-11

## 2021-06-11 NOTE — TELEPHONE ENCOUNTER
Benito Hampton is calling from [employer] to confirm receipt of a documentation request which was faxed to [phone]    Please advise    Benito Hampton can be reached at [phone]

## 2021-06-16 NOTE — TELEPHONE ENCOUNTER
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

## 2021-06-18 PROCEDURE — U0005 INFEC AGEN DETEC AMPLI PROBE: HCPCS | Performed by: ORTHOPAEDIC SURGERY

## 2021-06-18 PROCEDURE — U0003 INFECTIOUS AGENT DETECTION BY NUCLEIC ACID (DNA OR RNA); SEVERE ACUTE RESPIRATORY SYNDROME CORONAVIRUS 2 (SARS-COV-2) (CORONAVIRUS DISEASE [COVID-19]), AMPLIFIED PROBE TECHNIQUE, MAKING USE OF HIGH THROUGHPUT TECHNOLOGIES AS DESCRIBED BY CMS-2020-01-R: HCPCS | Performed by: ORTHOPAEDIC SURGERY

## 2021-06-21 NOTE — PRE-PROCEDURE INSTRUCTIONS
My Surgical Experience    The following information was developed to assist you to prepare for your operation  What do I need to do before coming to the hospital?   Arrange for a responsible person to drive you to and from the hospital    Arrange care for your children at home  Children are not allowed in the recovery areas of the hospital   Plan to wear clothing that is easy to put on and take off  If you are having shoulder surgery, wear a shirt that buttons or zippers in the front  Bathing  o Shower the evening before and the morning of your surgery with an antibacterial soap  Please refer to the Pre Op Showering Instructions for Surgery Patients Sheet   o Remove nail polish and all body piercing jewelry  o Do not shave any body part for at least 24 hours before surgery-this includes face, arms, legs and upper body  Food  o Nothing to eat or drink after midnight the night before your surgery  This includes candy and chewing gum  o Exception: If your surgery is after 12:00pm (noon), you may have clear liquids such as 7-Up®, ginger ale, apple or cranberry juice, Jell-O®, water, or clear broth until 8:00 am  o Do not drink milk or juice with pulp on the morning before surgery  o Do not drink alcohol 24 hours before surgery  Medicine  o Follow instructions you received from your surgeon about which medicines you may take on the day of surgery  o If instructed to take medicine on the morning of surgery, take pills with just a small sip of water  Call your prescribing doctor for specific infroamtion on what to do if you take insulin    What should I bring to the hospital?    Bring:  Nava Zuniga or a walker, if you have them, for foot or knee surgery   A list of the daily medicines, vitamins, minerals, herbals and nutritional supplements you take   Include the dosages of medicines and the time you take them each day   Glasses, dentures or hearing aids   Minimal clothing; you will be wearing hospital sleepwear   Photo ID; required to verify your identity   If you have a Living Will or Power of , bring a copy of the documents   If you have an ostomy, bring an extra pouch and any supplies you use    Do not bring   Medicines or inhalers   Money, valuables or jewelry    What other information should I know about the day of surgery?  Notify your surgeons if you develop a cold, sore throat, cough, fever, rash or any other illness   Report to the Ambulatory Surgical/Same Day Surgery Unit   You will be instructed to stop at Registration only if you have not been pre-registered   Inform your  fi they do not stay that they will be asked by the staff to leave a phone number where they can be reached   Be available to be reached before surgery  In the event the operating room schedule changes, you may be asked to come in earlier or later than expected    *It is important to tell your doctor and others involved in your health care if you are taking or have been taking any non-prescription drugs, vitamins, minerals, herbals or other nutritional supplements  Any of these may interact with some food or medicines and cause a reaction      Pre-Surgery Instructions:   Medication Instructions    lamoTRIgine (LaMICtal) 100 mg tablet Instructed patient per Anesthesia Guidelines   SUMAtriptan (IMITREX) 50 mg tablet Instructed patient per Anesthesia Guidelines  To take lamictal a m  of surgery

## 2021-06-22 NOTE — TELEPHONE ENCOUNTER
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

## 2021-06-23 ENCOUNTER — ANESTHESIA EVENT (OUTPATIENT)
Dept: PERIOP | Facility: HOSPITAL | Age: 36
End: 2021-06-23
Payer: COMMERCIAL

## 2021-06-24 ENCOUNTER — HOSPITAL ENCOUNTER (OUTPATIENT)
Facility: HOSPITAL | Age: 36
Setting detail: OUTPATIENT SURGERY
Discharge: HOME | End: 2021-06-24
Attending: ORTHOPAEDIC SURGERY | Admitting: ORTHOPAEDIC SURGERY
Payer: COMMERCIAL

## 2021-06-24 ENCOUNTER — ANESTHESIA (OUTPATIENT)
Dept: PERIOP | Facility: HOSPITAL | Age: 36
End: 2021-06-24
Payer: COMMERCIAL

## 2021-06-24 VITALS
HEART RATE: 76 BPM | OXYGEN SATURATION: 100 % | WEIGHT: 129 LBS | TEMPERATURE: 96.1 F | BODY MASS INDEX: 22.85 KG/M2 | RESPIRATION RATE: 18 BRPM | DIASTOLIC BLOOD PRESSURE: 66 MMHG | SYSTOLIC BLOOD PRESSURE: 110 MMHG

## 2021-06-24 LAB
EXT PREGNANCY TEST URINE: NEGATIVE
EXT. CONTROL: NORMAL

## 2021-06-24 PROCEDURE — 64718 REVISE ULNAR NERVE AT ELBOW: CPT | Performed by: ORTHOPAEDIC SURGERY

## 2021-06-24 PROCEDURE — 64718 REVISE ULNAR NERVE AT ELBOW: CPT | Performed by: PHYSICIAN ASSISTANT

## 2021-06-24 PROCEDURE — 81025 URINE PREGNANCY TEST: CPT | Performed by: ANESTHESIOLOGY

## 2021-06-24 RX ORDER — ONDANSETRON 2 MG/ML
4 INJECTION INTRAMUSCULAR; INTRAVENOUS ONCE AS NEEDED
Status: DISCONTINUED | OUTPATIENT
Start: 2021-06-24 | End: 2021-06-24 | Stop reason: HOSPADM

## 2021-06-24 RX ORDER — CEFAZOLIN SODIUM 2 G/50ML
2000 SOLUTION INTRAVENOUS ONCE
Status: DISCONTINUED | OUTPATIENT
Start: 2021-06-24 | End: 2021-06-25 | Stop reason: HOSPADM

## 2021-06-24 RX ORDER — FENTANYL CITRATE/PF 50 MCG/ML
25 SYRINGE (ML) INJECTION
Status: DISCONTINUED | OUTPATIENT
Start: 2021-06-24 | End: 2021-06-24 | Stop reason: HOSPADM

## 2021-06-24 RX ORDER — MIDAZOLAM HYDROCHLORIDE 2 MG/2ML
INJECTION, SOLUTION INTRAMUSCULAR; INTRAVENOUS AS NEEDED
Status: DISCONTINUED | OUTPATIENT
Start: 2021-06-24 | End: 2021-06-24

## 2021-06-24 RX ORDER — SODIUM CHLORIDE, SODIUM LACTATE, POTASSIUM CHLORIDE, CALCIUM CHLORIDE 600; 310; 30; 20 MG/100ML; MG/100ML; MG/100ML; MG/100ML
20 INJECTION, SOLUTION INTRAVENOUS CONTINUOUS
Status: DISCONTINUED | OUTPATIENT
Start: 2021-06-24 | End: 2021-06-25 | Stop reason: HOSPADM

## 2021-06-24 RX ORDER — CEFAZOLIN SODIUM 2 G/50ML
SOLUTION INTRAVENOUS AS NEEDED
Status: DISCONTINUED | OUTPATIENT
Start: 2021-06-24 | End: 2021-06-24

## 2021-06-24 RX ORDER — DEXAMETHASONE SODIUM PHOSPHATE 10 MG/ML
INJECTION, SOLUTION INTRAMUSCULAR; INTRAVENOUS AS NEEDED
Status: DISCONTINUED | OUTPATIENT
Start: 2021-06-24 | End: 2021-06-24

## 2021-06-24 RX ORDER — MAGNESIUM HYDROXIDE 1200 MG/15ML
LIQUID ORAL AS NEEDED
Status: DISCONTINUED | OUTPATIENT
Start: 2021-06-24 | End: 2021-06-24 | Stop reason: HOSPADM

## 2021-06-24 RX ORDER — FENTANYL CITRATE 50 UG/ML
INJECTION, SOLUTION INTRAMUSCULAR; INTRAVENOUS AS NEEDED
Status: DISCONTINUED | OUTPATIENT
Start: 2021-06-24 | End: 2021-06-24

## 2021-06-24 RX ORDER — HYDROMORPHONE HCL/PF 1 MG/ML
0.2 SYRINGE (ML) INJECTION
Status: DISCONTINUED | OUTPATIENT
Start: 2021-06-24 | End: 2021-06-24 | Stop reason: HOSPADM

## 2021-06-24 RX ORDER — SODIUM CHLORIDE, SODIUM LACTATE, POTASSIUM CHLORIDE, CALCIUM CHLORIDE 600; 310; 30; 20 MG/100ML; MG/100ML; MG/100ML; MG/100ML
125 INJECTION, SOLUTION INTRAVENOUS CONTINUOUS
Status: DISCONTINUED | OUTPATIENT
Start: 2021-06-24 | End: 2021-06-24

## 2021-06-24 RX ORDER — LIDOCAINE HYDROCHLORIDE 10 MG/ML
INJECTION, SOLUTION EPIDURAL; INFILTRATION; INTRACAUDAL; PERINEURAL AS NEEDED
Status: DISCONTINUED | OUTPATIENT
Start: 2021-06-24 | End: 2021-06-24

## 2021-06-24 RX ORDER — PROPOFOL 10 MG/ML
INJECTION, EMULSION INTRAVENOUS AS NEEDED
Status: DISCONTINUED | OUTPATIENT
Start: 2021-06-24 | End: 2021-06-24

## 2021-06-24 RX ORDER — ONDANSETRON 2 MG/ML
INJECTION INTRAMUSCULAR; INTRAVENOUS AS NEEDED
Status: DISCONTINUED | OUTPATIENT
Start: 2021-06-24 | End: 2021-06-24

## 2021-06-24 RX ORDER — BUPIVACAINE HYDROCHLORIDE 5 MG/ML
INJECTION, SOLUTION PERINEURAL AS NEEDED
Status: DISCONTINUED | OUTPATIENT
Start: 2021-06-24 | End: 2021-06-24 | Stop reason: HOSPADM

## 2021-06-24 RX ORDER — METOCLOPRAMIDE HYDROCHLORIDE 5 MG/ML
10 INJECTION INTRAMUSCULAR; INTRAVENOUS ONCE AS NEEDED
Status: DISCONTINUED | OUTPATIENT
Start: 2021-06-24 | End: 2021-06-24 | Stop reason: HOSPADM

## 2021-06-24 RX ADMIN — FENTANYL CITRATE 50 MCG: 50 INJECTION, SOLUTION INTRAMUSCULAR; INTRAVENOUS at 08:14

## 2021-06-24 RX ADMIN — PROPOFOL 200 MG: 10 INJECTION, EMULSION INTRAVENOUS at 07:58

## 2021-06-24 RX ADMIN — SODIUM CHLORIDE, SODIUM LACTATE, POTASSIUM CHLORIDE, AND CALCIUM CHLORIDE 125 ML/HR: .6; .31; .03; .02 INJECTION, SOLUTION INTRAVENOUS at 06:38

## 2021-06-24 RX ADMIN — FENTANYL CITRATE 25 MCG: 50 INJECTION, SOLUTION INTRAMUSCULAR; INTRAVENOUS at 07:58

## 2021-06-24 RX ADMIN — LIDOCAINE HYDROCHLORIDE 50 MG: 10 INJECTION, SOLUTION EPIDURAL; INFILTRATION; INTRACAUDAL; PERINEURAL at 07:58

## 2021-06-24 RX ADMIN — CEFAZOLIN SODIUM 2000 MG: 2 SOLUTION INTRAVENOUS at 08:01

## 2021-06-24 RX ADMIN — DEXAMETHASONE SODIUM PHOSPHATE 4 MG: 10 INJECTION, SOLUTION INTRAMUSCULAR; INTRAVENOUS at 08:09

## 2021-06-24 RX ADMIN — MIDAZOLAM 2 MG: 1 INJECTION INTRAMUSCULAR; INTRAVENOUS at 07:53

## 2021-06-24 RX ADMIN — ONDANSETRON 4 MG: 2 INJECTION INTRAMUSCULAR; INTRAVENOUS at 08:09

## 2021-06-24 RX ADMIN — FENTANYL CITRATE 25 MCG: 50 INJECTION, SOLUTION INTRAMUSCULAR; INTRAVENOUS at 08:07

## 2021-06-24 NOTE — INTERVAL H&P NOTE
H&P reviewed  After examining the patient I find no changes in the patients condition since the H&P had been written      Vitals:    06/24/21 0624   BP: 138/65   Pulse: 90   Resp: 18   Temp: (!) 96 1 °F (35 6 °C)   SpO2: 100%

## 2021-06-24 NOTE — ANESTHESIA PREPROCEDURE EVALUATION
Procedure:  TRANSPOSITION NERVE ULNAR (RIGHT) (Right Arm Lower)    Relevant Problems   ANESTHESIA (within normal limits)      CARDIO (within normal limits)      ENDO (within normal limits)      NEURO/PSYCH   (+) Depression with anxiety      PULMONARY (within normal limits)        Physical Exam    Airway    Mallampati score: I  TM Distance: >3 FB  Neck ROM: full     Dental   No notable dental hx     Cardiovascular  Rhythm: regular, Rate: normal,     Pulmonary  Breath sounds clear to auscultation,     Other Findings        Anesthesia Plan  ASA Score- 1     Anesthesia Type- general with ASA Monitors  Additional Monitors:   Airway Plan: LMA  Plan Factors-Exercise tolerance (METS): >4 METS  Chart reviewed  Existing labs reviewed  Patient summary reviewed  Patient is not a current smoker  Induction- intravenous  Postoperative Plan- Plan for postoperative opioid use  Planned trial extubation    Informed Consent- Anesthetic plan and risks discussed with patient  I personally reviewed this patient with the CRNA  Discussed and agreed on the Anesthesia Plan with the CRNA  Quan Rincon

## 2021-06-24 NOTE — PERIOPERATIVE NURSING NOTE
Pt d/c to home at this time w/  Joce  Via: Walking  Pt left with all belongings  Iv was D/C intact with dry sterile dressing  Encouraged to keep follow up appointments, Verbalized understanding  D/C instructions reviewed and explained  Verbalized understanding

## 2021-06-24 NOTE — DISCHARGE INSTRUCTIONS
INSTRUCTIONS FOLLOWING YOUR ELBOW SURGERY     First follow-up visit after surgery   Call the office the day after your surgery & make an appointment for 7 days after surgery  At that appointment, your wound will be evaluated for appropriate healing  Dressing & Wound Care   Keep the original dressing on until you are seen in the office  Keep your dressings as clean and dry as possible  Do not get your dressing wet  You may take a bath by covering your arm in a garbage bag or other waterproof bag, tie it securely with rubber bands and duct tape  Swelling and Discoloration   You will notice some swelling of your hand - this is normal  Elevate your hand above the level of your heart as much as possible for the first week  When you sleep, place your arm on several pillows  You may notice a bluish discoloration of your fingers  This is also normal  This discoloration may change from blue to purple to green to yellow, then will slowly go away over a few weeks time  Discomfort   You will experience some pain and discomfort after surgery  By the third day, this pain usually decreases significantly  You will be given a prescription for pain medication  Take the medication as prescribed  If the discomfort is mild, you can take 1 or 2 Tylenol every 4 - 6 hours as needed, INSTEAD of the prescribed pain medication  Temperature   A temperature of up to 101  0ºF is common for the first 2 days after surgery  If your temperature is elevated above 101 5º F, call the office  Exercise   You may gently open and close your fingers  Do not perform any exercises that cause you to sweat  Sweating may increase complications with your incision  We hope this answers many of your questions regarding your surgery  These are only guidelines however  If you have any other concerns or questions at any time, do not hesitate to call the office [phone]

## 2021-06-24 NOTE — PERIOPERATIVE NURSING NOTE
Patient received from PACU in 0/10 pain  Dressings were clean, dry, and intact  Neurovascular assessment WDL on the operative extremity  VSS  Will continue to monitor til discharge criteria is met  Post-Op processes and procedures were explained and all related patient and family questions were answered  Discharge instructions were given and all related patient and family questions were answered

## 2021-06-24 NOTE — OP NOTE
OPERATIVE REPORT  PATIENT NAME: Taras Thornton    :  1985  MRN: [id number]  Pt Location: [facility] OR ROOM 01    SURGERY DATE: 2021    Surgeon(s) and Role:     * Cierra Baez MD - Primary     * Keo Baker PA-C - Assisting necessary for the procedure for assistance with improved visualization due to the minimally invasive techniques utilized for the ulnar nerve transposition for assistance as well with retraction of vital structures well as assistance with transposition techniques for the nerve  Rosa Elena HUANG  [facility] 2nd assistant    Preop Diagnosis:  Cubital tunnel syndrome on right [G56 21]    Post-Op Diagnosis Codes:     * Cubital tunnel syndrome on right [G56 21]    Procedure(s) (LRB):  TRANSPOSITION NERVE ULNAR (RIGHT) (Right)    Specimen(s):  * No specimens in log *    Estimated Blood Loss:   Minimal    Drains:  * No LDAs found *    Anesthesia Type:   General    Operative Indications:  Cubital tunnel syndrome on right [G56 21]  Aly Serna is a 76-year-old female who unfortunately been suffering long-term with right hand ulnar nerve distribution numbness into the little and ring finger as well as pain at the elbow  She was also having some weakness into the right hand  Dalia did not demonstrate abnormality in this region however to 30% of these cases do not demonstrate abnormality on EMG  She had failed nonoperative measures and wished to undergo a right elbow ulnar nerve transposition  She understood the risks and benefits of that procedure wished to go ahead  The risks are inclusive of but not limited to infection, stiffness, nerve injury causing numbness pain and weakness, worsening of symptoms, failure to regain full strength and ability, blood clots, failure to achieve anticipated results, and need for further surgery  Operative Findings:  Right elbow examined under loupe magnification demonstrated significant constriction of the ulnar nerve in the cubital tunnel    We were able to very nicely and completely release the nerve from the constriction in the tunnel  We were able to transpose the very nicely anterior to the medial epicondyle and held it nicely in position utilizing autograft from the medial intermuscular septum that we excised and use of the sling  Excellent range of motion was achieved at the end of the procedure without any signs of impingement on the nerve in flexion or extension  Complications:   None    Procedure and Technique: Anand Quiroga was taken to the operating room and placed supine on the OR table  She was given preoperative IV antibiotics  General anesthesia was induced in the right upper extremity was prepped and draped in usual sterile fashion  A surgical time-out was taken  We did exsanguinate and inflated tourniquet  Total tourniquet time was less than 45 minutes  Loupe magnification was utilized throughout the operation  A longitudinal incision was utilized over the ulnar aspect of the elbow centered over the medial epicondyle utilizing a 15 blade through skin  We were able to very carefully dissect down to the nerve itself and carefully opened the cubital tunnel  We were able to dissect very carefully around the nerve and passed a Penrose drain around the nerve to gently retract the nerve throughout the operation  We did very careful release of the attachments of the ulnar nerve in this region and made certain to manipulate the nerve very gently  We then excised the medial intermuscular septum and transposed the nerve anteriorly  We were able to make a sling utilizing the medial intermuscular septum which was a loose sling allowing the nerve to stay transposed anteriorly throughout the range of motion  No signs of impingement were found after the transposition was complete with the sling created utilizing 0 Vicryl suture to hold it in place  Excellent range of motion was found with no impingement about the elbow    We then irrigated thoroughly and closed with 2-0 Vicryl and 4-0 Vicryl and skin glue and applied local anesthetic into the area  Dry, sterile dressings were applied with a posterior splint  She tolerated procedure well and transferred recovery room stable condition she will follow up in approximately 10 days for wound check  She will have a sling on for 3 weeks     I was present for the entire procedure and A qualified resident physician was not available    Patient Disposition:  PACU     SIGNATURE: Belkis Munoz MD  DATE: June 24, 2021  TIME: 8:53 AM

## 2021-06-24 NOTE — ANESTHESIA POSTPROCEDURE EVALUATION
Post-Op Assessment Note    CV Status:  Stable  Pain Score: 0    Pain management: adequate     Mental Status:  Alert and awake   Hydration Status:  Euvolemic   PONV Controlled:  Controlled   Airway Patency:  Patent      Post Op Vitals Reviewed: Yes      Staff: CRNA         No complications documented      BP   118/60   Temp  98 2   Pulse  88   Resp   18   SpO2   99

## 2021-06-25 ENCOUNTER — TELEPHONE (OUTPATIENT)
Dept: OBGYN CLINIC | Facility: HOSPITAL | Age: 36
End: 2021-06-25

## 2021-06-25 NOTE — TELEPHONE ENCOUNTER
Patient sees Dr Daniella Vigil Aid is calling in from [address] [place] [address] wanting to know if this patient did have surgery, and wanted to know if any upcoming appointments made at this time

## 2021-06-28 ENCOUNTER — TELEPHONE (OUTPATIENT)
Dept: OBGYN CLINIC | Facility: HOSPITAL | Age: 36
End: 2021-06-28

## 2021-06-28 NOTE — TELEPHONE ENCOUNTER
Dr Ace Weaver    Patient is asking the status of her disability paperwork  This was dropped off 6/8        # [phone]

## 2021-06-29 NOTE — TELEPHONE ENCOUNTER
The paper work that the patient had dropped off for [employer] was completed, it was an Attending Physicians Statement and faxed with the other requests received for [employer]  (see other task before Surgery)  Lvm Advised this was available for

## 2021-07-01 ENCOUNTER — TELEPHONE (OUTPATIENT)
Dept: OBGYN CLINIC | Facility: HOSPITAL | Age: 36
End: 2021-07-01

## 2021-07-01 ENCOUNTER — OFFICE VISIT (OUTPATIENT)
Dept: OBGYN CLINIC | Facility: CLINIC | Age: 36
End: 2021-07-01

## 2021-07-01 DIAGNOSIS — Z47.89 AFTERCARE FOLLOWING SURGERY OF THE MUSCULOSKELETAL SYSTEM: Primary | ICD-10-CM

## 2021-07-01 PROCEDURE — 99024 POSTOP FOLLOW-UP VISIT: CPT | Performed by: ORTHOPAEDIC SURGERY

## 2021-07-01 NOTE — PROGRESS NOTES
Assessment/Plan:  1  Aftercare following surgery of the musculoskeletal system         Scribe Attestation    I,:  Barbie Rosario MA am acting as a scribe while in the presence of the attending physician :       I,:  Chad Dupree DO personally performed the services described in this documentation    as scribed in my presence :             Patient presents for a splint check  The splint was removed in the office  The incision is healing well  A new posterior splint was applied in the office today  She will follow up as scheduled with her surgeon Dr Vern Sanches as previously scheduled  Subjective: Iván Manning is a 29 y o  female who presents to the office today for follow up evaluation 1 week s/p right ulnar nerve transposition performed by my partner Dr Vern Sanches on 6/24/21  Patient presents to the office today for a splint check  Patient states she fell in the pool  Review of Systems   Constitutional: Negative for chills and fever  HENT: Negative for drooling and sneezing  Eyes: Negative for redness  Respiratory: Negative for cough and wheezing  Gastrointestinal: Negative for nausea and vomiting  Musculoskeletal: Negative for arthralgias, joint swelling and myalgias  Neurological: Negative for weakness and numbness  Psychiatric/Behavioral: Negative for behavioral problems  The patient is not nervous/anxious  Past Medical History:   Diagnosis Date    Depression     Migraines        Past Surgical History:   Procedure Laterality Date    EPIDURAL BLOCK INJECTION      for chilbirth    NO PAST SURGERIES      MO REVISE ULNAR NERVE AT ELBOW Right 6/24/2021    Procedure: TRANSPOSITION NERVE ULNAR (RIGHT);   Surgeon: Germania Jc MD;  [facility];  Service: Orthopedics       Family History   Problem Relation Age of Onset    No Known Problems Mother     No Known Problems Father        Social History     Occupational History    Not on file   Tobacco Use    Smoking status: Never Smoker    Smokeless tobacco: Never Used   Vaping Use    Vaping Use: Never used   Substance and Sexual Activity    Alcohol use: Not Currently    Drug use: Never    Sexual activity: Not on file         Current Outpatient Medications:     lamoTRIgine (LaMICtal) 100 mg tablet, daily, Disp: , Rfl:     SUMAtriptan (IMITREX) 50 mg tablet, as needed, Disp: , Rfl:     No Known Allergies    Objective: There were no vitals filed for this visit  Ortho Exam     Right elbow    No wounds  Incision healing well  Compartments soft  Brisk capillary refill  S/m intact median, radial, and ulnar nerve     Physical Exam  Constitutional:       Appearance: She is well-developed  HENT:      Head: Normocephalic and atraumatic  Eyes:      General:         Right eye: No discharge  Left eye: No discharge  Conjunctiva/sclera: Conjunctivae normal    Cardiovascular:      Rate and Rhythm: Normal rate  Pulmonary:      Effort: Pulmonary effort is normal  No respiratory distress  Musculoskeletal:      Cervical back: Normal range of motion and neck supple  Comments: As noted in HPI   Skin:     General: Skin is warm and dry  Neurological:      Mental Status: She is alert and oriented to person, place, and time  Psychiatric:         Behavior: Behavior normal          Thought Content:  Thought content normal          Judgment: Judgment normal

## 2021-07-01 NOTE — TELEPHONE ENCOUNTER
Patient sees Dr Susy Dunn  Patient is calling because she fell in her pool and got her bandages wet and had to remove them  She did look at the wound and it is healing  She would like to know if she should use something to cover it or keep it uncovered, or should she come back into the office          Transferred to triage nurse

## 2021-07-13 ENCOUNTER — OFFICE VISIT (OUTPATIENT)
Dept: OBGYN CLINIC | Facility: CLINIC | Age: 36
End: 2021-07-13

## 2021-07-13 DIAGNOSIS — G56.21 CUBITAL TUNNEL SYNDROME ON RIGHT: Primary | ICD-10-CM

## 2021-07-13 DIAGNOSIS — Z47.89 AFTERCARE FOLLOWING SURGERY OF THE MUSCULOSKELETAL SYSTEM: ICD-10-CM

## 2021-07-13 PROCEDURE — 99024 POSTOP FOLLOW-UP VISIT: CPT | Performed by: ORTHOPAEDIC SURGERY

## 2021-07-13 NOTE — PROGRESS NOTES
Assessment/Plan:  1  Cubital tunnel syndrome on right     2  Aftercare following surgery of the musculoskeletal system         Scribe Attestation    I,:  Mark Liu am acting as a scribe while in the presence of the attending physician :       I,:  Gagandeep Mccain MD personally performed the services described in this documentation    as scribed in my presence :           Rody Reddy upon examination does appear to be doing well now approximately 3 weeks status post right elbow ulnar nerve transposition  The surgical site is clean, dry and intact  At this point, she may discontinue the sling  I did encourage her to begin gentle range of motion of the elbow into flexion and extension  She has to lift no greater than 1 lb over the next 6 weeks  I did note that she will be restricted from heavy lifting until 3 months postoperatively  She may patricia in the pool  However, is to avoid any strokes at this time  She may submerge the arm in water as there is a low infection risk  I would like her follow up with me in 6 weeks time for repeat clinical evaluation  Subjective: Charly Horvath is a 29 y o  female who presents to the office today for  Follow-up evaluation of her right elbow  She is now approximately 3 weeks status post ulnar nerve transposition  She states she is doing well overall and notes that she is experiencing minimal pain at the medial aspect of her elbow  She states she does still have some residual symptoms of paresthesias extending into the small finger and ring finger  She notes that her elbow is also stiff however has been immobilized in a posterior splint with sling over the past 2 weeks  She denies any fevers or chills  She also denies any heat or erythema at the surgical site  Review of Systems   Constitutional: Negative for chills, fever and unexpected weight change  HENT: Negative for hearing loss, nosebleeds and sore throat      Eyes: Negative for pain, redness and visual disturbance  Respiratory: Negative for cough, shortness of breath and wheezing  Cardiovascular: Negative for chest pain, palpitations and leg swelling  Gastrointestinal: Negative for abdominal pain, nausea and vomiting  Endocrine: Negative for polydipsia and polyuria  Genitourinary: Negative for dysuria and hematuria  Musculoskeletal: Positive for arthralgias and joint swelling  See HPI   Skin: Negative for rash and wound  Neurological: Positive for numbness  Negative for dizziness and headaches  Psychiatric/Behavioral: Negative for decreased concentration and suicidal ideas  The patient is not nervous/anxious  Past Medical History:   Diagnosis Date    Depression     Migraines        Past Surgical History:   Procedure Laterality Date    EPIDURAL BLOCK INJECTION      for chilbirth    NO PAST SURGERIES      [place] REVISE ULNAR NERVE AT ELBOW Right 6/24/2021    Procedure: TRANSPOSITION NERVE ULNAR (RIGHT); Surgeon: Sarah Villela MD;  Location: [facility];  Service: Orthopedics       Family History   Problem Relation Age of Onset    No Known Problems Mother     No Known Problems Father        Social History     Occupational History    Not on file   Tobacco Use    Smoking status: Never Smoker    Smokeless tobacco: Never Used   Vaping Use    Vaping Use: Never used   Substance and Sexual Activity    Alcohol use: Not Currently    Drug use: Never    Sexual activity: Not on file         Current Outpatient Medications:     lamoTRIgine (LaMICtal) 100 mg tablet, daily, Disp: , Rfl:     SUMAtriptan (IMITREX) 50 mg tablet, as needed, Disp: , Rfl:     No Known Allergies    Objective: There were no vitals filed for this visit  Right Elbow Exam     Tenderness   The patient is experiencing tenderness in the medial epicondyle       Range of Motion   Extension: -10   Flexion: 100     Other   Erythema: absent  Scars: absent  Sensation: decreased (  Ulnar nerve transposition)  Pulse: present            Physical Exam  Vitals reviewed  HENT:      Head: Normocephalic and atraumatic  Eyes:      General:         Right eye: No discharge  Left eye: No discharge  Conjunctiva/sclera: Conjunctivae normal       Pupils: Pupils are equal, round, and reactive to light  Cardiovascular:      Rate and Rhythm: Normal rate  Pulmonary:      Effort: Pulmonary effort is normal  No respiratory distress  Musculoskeletal:      Cervical back: Normal range of motion and neck supple  Comments: As noted in HPI   Skin:     General: Skin is warm and dry  Neurological:      Mental Status: She is alert and oriented to person, place, and time

## 2021-07-22 ENCOUNTER — TELEPHONE (OUTPATIENT)
Dept: OBGYN CLINIC | Facility: MEDICAL CENTER | Age: 36
End: 2021-07-22

## 2021-07-22 NOTE — TELEPHONE ENCOUNTER
Patient sees Dr Haresh Cervantes    Patient is requesting an updated work note to be faxed to her employer attn:  Joana Nguyễn number # [phone]

## 2021-08-10 ENCOUNTER — TELEPHONE (OUTPATIENT)
Dept: OBGYN CLINIC | Facility: CLINIC | Age: 36
End: 2021-08-10

## 2021-08-10 NOTE — TELEPHONE ENCOUNTER
Breann Hemphill,         Patient is calling in requesting a call  Patient is having post op issues  She had sx 6/24 R elbow  She stated she had a little stitch that was coming out and it opened up a little  Patient now has white/yellow puss coming out  Tender to the touch, very painful  No fevers  I scheduled pt with stephen tomorrow 8/11 @1pm for a wound check  She would like to know what can she do in the mean time?         Please advise,     Davi#: [phone]

## 2021-08-11 ENCOUNTER — OFFICE VISIT (OUTPATIENT)
Dept: OBGYN CLINIC | Facility: CLINIC | Age: 36
End: 2021-08-11

## 2021-08-11 DIAGNOSIS — G56.21 CUBITAL TUNNEL SYNDROME ON RIGHT: Primary | ICD-10-CM

## 2021-08-11 PROCEDURE — 99024 POSTOP FOLLOW-UP VISIT: CPT | Performed by: PHYSICIAN ASSISTANT

## 2021-08-11 NOTE — PROGRESS NOTES
Assessment/Plan:  1  Cubital tunnel syndrome on right  Ambulatory referral to PT/OT hand therapy       The patient's wound looks well healed today, without any signs of infection  We did discuss that this was a spitting stitch with probably a small stitch abscess  There is no evidence of this today though  I am concerned about her elbow stiffness though  She can only actively flex the elbow to about 40  I did provide her with a prescription for therapy for her elbow  She will work on range of motion on her own at home as well  She will follow up as previously scheduled  Subjective: Kody Harrell is a 29 y o  female who presents today for follow-up of her right elbow, now about 6 weeks status post ulnar nerve transposition  She had been doing well but a few days ago noticed a spitting stitch about her wound  She did pull this out 2 days ago  There was a small amount of purulence when she pulled this out, though this has since resolved  She denies any erythema of the wound at this point  She also denies any discomfort  She notes good sensation of the right upper extremity  Her main complaint is stiffness of the elbow today  She notes good extension but significant limitations in elbow flexion  Review of Systems      Past Medical History:   Diagnosis Date    Depression     Migraines        Past Surgical History:   Procedure Laterality Date    EPIDURAL BLOCK INJECTION      for chilbirth    NO PAST SURGERIES      HI REVISE ULNAR NERVE AT ELBOW Right 6/24/2021    Procedure: TRANSPOSITION NERVE ULNAR (RIGHT);   Surgeon: Erlin Penaloza MD;  Location: [facility];  Service: Orthopedics       Family History   Problem Relation Age of Onset    No Known Problems Mother     No Known Problems Father        Social History     Occupational History    Not on file   Tobacco Use    Smoking status: Never Smoker    Smokeless tobacco: Never Used   Vaping Use    Vaping Use: Never used   Substance and Sexual Activity    Alcohol use: Not Currently    Drug use: Never    Sexual activity: Not on file         Current Outpatient Medications:     lamoTRIgine (LaMICtal) 100 mg tablet, daily, Disp: , Rfl:     SUMAtriptan (IMITREX) 50 mg tablet, as needed, Disp: , Rfl:     No Known Allergies    Objective: There were no vitals filed for this visit  Right Elbow Exam     Tenderness   The patient is experiencing no tenderness  Range of Motion   Extension: 0   Flexion: 40     Other   Erythema: absent  Sensation: normal  Pulse: present    Comments:    Well-healed surgical scar  No spitting stitch noted  No erythema  Sensation intact ulnar nerve distribution              Physical Exam

## 2021-08-17 ENCOUNTER — OFFICE VISIT (OUTPATIENT)
Dept: OBGYN CLINIC | Facility: CLINIC | Age: 36
End: 2021-08-17

## 2021-08-17 VITALS — HEIGHT: 63 IN | BODY MASS INDEX: 22.22 KG/M2 | WEIGHT: 125.4 LBS

## 2021-08-17 DIAGNOSIS — G56.21 CUBITAL TUNNEL SYNDROME ON RIGHT: Primary | ICD-10-CM

## 2021-08-17 PROCEDURE — 99024 POSTOP FOLLOW-UP VISIT: CPT | Performed by: ORTHOPAEDIC SURGERY

## 2021-08-17 NOTE — PROGRESS NOTES
Assessment/Plan:  1  Cubital tunnel syndrome on right       The patient's ulnar nerve appears to be functioning well at this point, however she has developed post operative stiffness  She will continue to work on ROM of the elbow  She should have a goal of increasing her ROM a couple degrees a day  We encourage her to use her arm for ADLs which should help her ROM as well  She will follow-up as needed for this  Subjective: Johan Moore is a 29 y o  female who presents today for follow-up of her right elbow, now almost 8 weeks status post ulnar nerve transposition  She was prescribed physical therapy, but has not been able to start this yet as she has 2 children and is a single mom  She notes minimal pain, no wound issues, and good sensation of the left upper extremity  She still notes significant stiffness about her elbow  Review of Systems      Past Medical History:   Diagnosis Date    Depression     Migraines        Past Surgical History:   Procedure Laterality Date    EPIDURAL BLOCK INJECTION      for chilbirth    NO PAST SURGERIES      [place] REVISE ULNAR NERVE AT ELBOW Right 6/24/2021    Procedure: TRANSPOSITION NERVE ULNAR (RIGHT); Surgeon: Reyna Goldberg MD;  Location: [facility];  Service: Orthopedics       Family History   Problem Relation Age of Onset    No Known Problems Mother     No Known Problems Father        Social History     Occupational History    Not on file   Tobacco Use    Smoking status: Never Smoker    Smokeless tobacco: Never Used   Vaping Use    Vaping Use: Never used   Substance and Sexual Activity    Alcohol use: Not Currently    Drug use: Never    Sexual activity: Not on file         Current Outpatient Medications:     lamoTRIgine (LaMICtal) 100 mg tablet, daily, Disp: , Rfl:     SUMAtriptan (IMITREX) 50 mg tablet, as needed, Disp: , Rfl:     No Known Allergies    Objective: There were no vitals filed for this visit      Right Elbow Exam     Tenderness   The patient is experiencing no tenderness       Range of Motion   Extension: 0   Flexion: 100     Other   Erythema: absent  Scars: present  Sensation: normal  Pulse: present            Physical Exam

## 2021-09-08 ENCOUNTER — TELEPHONE (OUTPATIENT)
Dept: OBGYN CLINIC | Facility: CLINIC | Age: 36
End: 2021-09-08

## 2021-09-08 NOTE — TELEPHONE ENCOUNTER
Patient last appointment on 8/17/21 states she can follow up as needed for this  Her letter states estimated RTW 9/24/21  I have forms to fill out for her so not sure if you told her to return to work  No new letters in chart

## 2021-09-08 NOTE — TELEPHONE ENCOUNTER
Attempted to call to see when she is returning to work  Her voicemail box was full unable to leave message

## 2021-09-13 NOTE — TELEPHONE ENCOUNTER
Attempted to call patient again  Once again goes straight to  and unable to leave message because alaina  box is still full  Paperwork has been filled out and faxed to the best of our ability

## 2021-09-21 ENCOUNTER — TELEPHONE (OUTPATIENT)
Dept: OBGYN CLINIC | Facility: HOSPITAL | Age: 36
End: 2021-09-21

## 2021-09-28 ENCOUNTER — TELEPHONE (OUTPATIENT)
Dept: FAMILY MEDICINE CLINIC | Facility: CLINIC | Age: 36
End: 2021-09-28

## 2021-09-28 ENCOUNTER — OFFICE VISIT (OUTPATIENT)
Dept: OBGYN CLINIC | Facility: CLINIC | Age: 36
End: 2021-09-28
Payer: COMMERCIAL

## 2021-09-28 VITALS
SYSTOLIC BLOOD PRESSURE: 126 MMHG | BODY MASS INDEX: 22.15 KG/M2 | WEIGHT: 125 LBS | DIASTOLIC BLOOD PRESSURE: 84 MMHG | HEART RATE: 81 BPM | HEIGHT: 63 IN

## 2021-09-28 DIAGNOSIS — G56.21 CUBITAL TUNNEL SYNDROME ON RIGHT: Primary | ICD-10-CM

## 2021-09-28 DIAGNOSIS — G43.909 MIGRAINE WITHOUT STATUS MIGRAINOSUS, NOT INTRACTABLE, UNSPECIFIED MIGRAINE TYPE: Primary | ICD-10-CM

## 2021-09-28 DIAGNOSIS — Z47.89 AFTERCARE FOLLOWING SURGERY OF THE MUSCULOSKELETAL SYSTEM: ICD-10-CM

## 2021-09-28 PROCEDURE — 1036F TOBACCO NON-USER: CPT | Performed by: ORTHOPAEDIC SURGERY

## 2021-09-28 PROCEDURE — 3008F BODY MASS INDEX DOCD: CPT | Performed by: ORTHOPAEDIC SURGERY

## 2021-09-28 PROCEDURE — 99213 OFFICE O/P EST LOW 20 MIN: CPT | Performed by: ORTHOPAEDIC SURGERY

## 2021-09-28 RX ORDER — SUMATRIPTAN 50 MG/1
50 TABLET, FILM COATED ORAL AS NEEDED
Qty: 9 TABLET | Refills: 0 | Status: SHIPPED | OUTPATIENT
Start: 2021-09-28

## 2021-09-28 NOTE — TELEPHONE ENCOUNTER
I spoke with Ponce Couch  She does have Imitrex on her med list (historical provider) She is asking for a refill of this until she sees Neurology   She uses [facility] in [place]

## 2021-09-28 NOTE — TELEPHONE ENCOUNTER
[facility] is aware   No further action required [address] [place] [address]

## 2021-09-28 NOTE — TELEPHONE ENCOUNTER
DR MAURICE    Patient said you are aware she has migraines  She is asking for you to send in a migraine medication until she gets a neuro?

## 2021-09-28 NOTE — PROGRESS NOTES
Assessment/Plan:  1  Cubital tunnel syndrome on right  Ambulatory referral to Physical Therapy   2  Aftercare following surgery of the musculoskeletal system  Ambulatory referral to PT/OT hand therapy       Scribe Attestation    I,:  Mary Gonzalez am acting as a scribe while in the presence of the attending physician :       I,:  Jordyn Perez MD personally performed the services described in this documentation    as scribed in my presence :           Upon evaluation of the right hand and elbow, I believe that Moy Jacques is presenting with signs and symptoms consistent with mild post-operative stiffness of the right elbow and mild weakness of the hand  I discussed with Moy Jacques that I believe she could experience improvement in her elbow and hand function with formal physical therapy since simply performing ADLs did not lead to her gaining back her full range of motion at the elbow and function of the right hand  I will write an order for her to attend physical therapy to improve strength and range of motion of the right upper extremity especially at the right elbow and hand  I would like to follow up with Moy Jacques again in 6 weeks for repeat evaluation of her right elbow and hand after she attends physical therapy for 6 weeks  Subjective: Mariana Uriostegui is a 29 y o  female who presents to the office today for a repeat evaluation of her right elbow and hand  Moy Jacques underwent a right elbow transposition of the ulnar nerve surgery on June 24,2021, about 3 months ago  Moy Jacques states that she is currently experiencing a decrease in her right elbow flexion range of motion  She also reports difficulty and pain with fine motor movements of the right hand  She states that she is concerned for her job responsibilities which require her to be able to write frequently  Reports that when she was a child she had a injury to her hand that led to numbness in her right hand     She reports that she would like to potentially pursue physical therapy now that her kids are back in school so she has more available time to attend  We had discussed physical therapy at previous visits, but she was unable to do this due to childcare issues  Review of Systems   Constitutional: Negative for chills and fever  HENT: Negative for ear pain and sore throat  Eyes: Negative for pain and visual disturbance  Respiratory: Negative for cough and shortness of breath  Cardiovascular: Negative for chest pain and palpitations  Gastrointestinal: Negative for abdominal pain and vomiting  Genitourinary: Negative for dysuria and hematuria  Musculoskeletal: Negative for arthralgias and back pain  Skin: Negative for color change and rash  Neurological: Negative for seizures and syncope  All other systems reviewed and are negative  Past Medical History:   Diagnosis Date    Depression     Migraines        Past Surgical History:   Procedure Laterality Date    EPIDURAL BLOCK INJECTION      for chilbirth    NO PAST SURGERIES      [place] REVISE ULNAR NERVE AT ELBOW Right 6/24/2021    Procedure: TRANSPOSITION NERVE ULNAR (RIGHT);   Surgeon: Marisol Kim MD;  Location: [facility];  Service: Orthopedics       Family History   Problem Relation Age of Onset    No Known Problems Mother     No Known Problems Father        Social History     Occupational History    Not on file   Tobacco Use    Smoking status: Never Smoker    Smokeless tobacco: Never Used   Vaping Use    Vaping Use: Never used   Substance and Sexual Activity    Alcohol use: Not Currently    Drug use: Never    Sexual activity: Not on file         Current Outpatient Medications:     lamoTRIgine (LaMICtal) 100 mg tablet, daily, Disp: , Rfl:     SUMAtriptan (IMITREX) 50 mg tablet, Take 1 tablet (50 mg total) by mouth as needed for migraine, Disp: 9 tablet, Rfl: 0    No Known Allergies    Objective:  Vitals:    09/28/21 1005   BP: 126/84   Pulse: 81 Right Hand Exam     Other   Erythema: absent  Scars: absent  Pulse: present    Comments:  Right hand strength testin+/5 1st dorsal interosseous  4/5 little finger   5/5 APB  5/5 EPL  5/5 FPL    Vera demonstrates normal sensation along the ulnar aspect of little finger, but not the radial aspect  Sensation to light touch is otherwise intact in the radial and median nerve distributions    Lorie Kawasaki also demonstrates normal sensation over the radial aspect of the ring finger, but decreased sensation along ulnar aspect of ring finger  Right Elbow Exam     Range of Motion   Extension: 0   Flexion: 110     Other   Erythema: absent  Scars: absent  Sensation: normal  Pulse: present    Comments:  Right elbow incision from right elbow ulnar nerve transposition performed on 21 demonstrates proper healing  Physical Exam  HENT:      Head: Normocephalic and atraumatic  Eyes:      General:         Right eye: No discharge  Left eye: No discharge  Extraocular Movements: Extraocular movements intact  Conjunctiva/sclera: Conjunctivae normal    Cardiovascular:      Rate and Rhythm: Normal rate  Pulmonary:      Effort: Pulmonary effort is normal  No respiratory distress  Musculoskeletal:         General: Tenderness present  Cervical back: Normal range of motion and neck supple  Skin:     General: Skin is warm and dry  Neurological:      Mental Status: She is alert and oriented to person, place, and time     Psychiatric:         Mood and Affect: Mood normal          Behavior: Behavior normal

## 2021-09-30 ENCOUNTER — TELEPHONE (OUTPATIENT)
Dept: OBGYN CLINIC | Facility: HOSPITAL | Age: 36
End: 2021-09-30

## 2021-09-30 NOTE — TELEPHONE ENCOUNTER
Patient sees Dr Aldo Lu from the [facility] is calling for 9/28 offices notes      Faxed to [phone]

## 2021-10-05 ENCOUNTER — EVALUATION (OUTPATIENT)
Dept: PHYSICAL THERAPY | Facility: CLINIC | Age: 36
End: 2021-10-05
Payer: COMMERCIAL

## 2021-10-05 DIAGNOSIS — G56.21 CUBITAL TUNNEL SYNDROME ON RIGHT: ICD-10-CM

## 2021-10-05 PROCEDURE — 97110 THERAPEUTIC EXERCISES: CPT

## 2021-10-05 PROCEDURE — 97161 PT EVAL LOW COMPLEX 20 MIN: CPT

## 2021-10-07 ENCOUNTER — OFFICE VISIT (OUTPATIENT)
Dept: PHYSICAL THERAPY | Facility: CLINIC | Age: 36
End: 2021-10-07
Payer: COMMERCIAL

## 2021-10-07 DIAGNOSIS — G56.21 CUBITAL TUNNEL SYNDROME ON RIGHT: Primary | ICD-10-CM

## 2021-10-07 PROCEDURE — 97110 THERAPEUTIC EXERCISES: CPT

## 2021-10-12 ENCOUNTER — OFFICE VISIT (OUTPATIENT)
Dept: PHYSICAL THERAPY | Facility: CLINIC | Age: 36
End: 2021-10-12
Payer: COMMERCIAL

## 2021-10-12 DIAGNOSIS — G56.21 CUBITAL TUNNEL SYNDROME ON RIGHT: Primary | ICD-10-CM

## 2021-10-12 PROCEDURE — 97110 THERAPEUTIC EXERCISES: CPT

## 2021-10-12 PROCEDURE — 97112 NEUROMUSCULAR REEDUCATION: CPT

## 2021-10-14 ENCOUNTER — APPOINTMENT (OUTPATIENT)
Dept: PHYSICAL THERAPY | Facility: CLINIC | Age: 36
End: 2021-10-14
Payer: COMMERCIAL

## 2021-10-15 ENCOUNTER — OFFICE VISIT (OUTPATIENT)
Dept: PHYSICAL THERAPY | Facility: CLINIC | Age: 36
End: 2021-10-15
Payer: COMMERCIAL

## 2021-10-15 ENCOUNTER — APPOINTMENT (OUTPATIENT)
Dept: PHYSICAL THERAPY | Facility: CLINIC | Age: 36
End: 2021-10-15
Payer: COMMERCIAL

## 2021-10-15 DIAGNOSIS — G56.21 CUBITAL TUNNEL SYNDROME ON RIGHT: Primary | ICD-10-CM

## 2021-10-15 PROCEDURE — 97112 NEUROMUSCULAR REEDUCATION: CPT

## 2021-10-15 PROCEDURE — 97110 THERAPEUTIC EXERCISES: CPT

## 2021-10-19 ENCOUNTER — OFFICE VISIT (OUTPATIENT)
Dept: PHYSICAL THERAPY | Facility: CLINIC | Age: 36
End: 2021-10-19
Payer: COMMERCIAL

## 2021-10-19 DIAGNOSIS — G56.21 CUBITAL TUNNEL SYNDROME ON RIGHT: Primary | ICD-10-CM

## 2021-10-19 PROCEDURE — 97110 THERAPEUTIC EXERCISES: CPT

## 2021-10-19 PROCEDURE — 97112 NEUROMUSCULAR REEDUCATION: CPT

## 2021-10-21 ENCOUNTER — OFFICE VISIT (OUTPATIENT)
Dept: PHYSICAL THERAPY | Facility: CLINIC | Age: 36
End: 2021-10-21
Payer: COMMERCIAL

## 2021-10-21 DIAGNOSIS — G56.21 CUBITAL TUNNEL SYNDROME ON RIGHT: Primary | ICD-10-CM

## 2021-10-21 PROCEDURE — 97112 NEUROMUSCULAR REEDUCATION: CPT

## 2021-10-21 PROCEDURE — 97110 THERAPEUTIC EXERCISES: CPT

## 2021-10-26 ENCOUNTER — EVALUATION (OUTPATIENT)
Dept: PHYSICAL THERAPY | Facility: CLINIC | Age: 36
End: 2021-10-26
Payer: COMMERCIAL

## 2021-10-26 ENCOUNTER — OFFICE VISIT (OUTPATIENT)
Dept: FAMILY MEDICINE CLINIC | Facility: CLINIC | Age: 36
End: 2021-10-26
Payer: COMMERCIAL

## 2021-10-26 VITALS
SYSTOLIC BLOOD PRESSURE: 124 MMHG | DIASTOLIC BLOOD PRESSURE: 78 MMHG | HEART RATE: 102 BPM | BODY MASS INDEX: 22.68 KG/M2 | HEIGHT: 63 IN | WEIGHT: 128 LBS | TEMPERATURE: 98.2 F | RESPIRATION RATE: 18 BRPM

## 2021-10-26 DIAGNOSIS — Z91.89 AT RISK FOR DOMESTIC ABUSE: ICD-10-CM

## 2021-10-26 DIAGNOSIS — F32.0 CURRENT MILD EPISODE OF MAJOR DEPRESSIVE DISORDER, UNSPECIFIED WHETHER RECURRENT (HCC): ICD-10-CM

## 2021-10-26 DIAGNOSIS — F43.10 PTSD (POST-TRAUMATIC STRESS DISORDER): ICD-10-CM

## 2021-10-26 DIAGNOSIS — F41.9 ANXIETY: Primary | ICD-10-CM

## 2021-10-26 DIAGNOSIS — F41.9 ANXIETY: ICD-10-CM

## 2021-10-26 DIAGNOSIS — G56.21 CUBITAL TUNNEL SYNDROME ON RIGHT: Primary | ICD-10-CM

## 2021-10-26 PROCEDURE — 97110 THERAPEUTIC EXERCISES: CPT

## 2021-10-26 PROCEDURE — 97112 NEUROMUSCULAR REEDUCATION: CPT

## 2021-10-26 PROCEDURE — 3725F SCREEN DEPRESSION PERFORMED: CPT | Performed by: FAMILY MEDICINE

## 2021-10-26 PROCEDURE — 99214 OFFICE O/P EST MOD 30 MIN: CPT | Performed by: FAMILY MEDICINE

## 2021-10-26 RX ORDER — ESCITALOPRAM OXALATE 10 MG/1
10 TABLET ORAL DAILY
Qty: 30 TABLET | Refills: 0 | Status: SHIPPED | OUTPATIENT
Start: 2021-10-26 | End: 2021-10-26

## 2021-10-26 RX ORDER — ESCITALOPRAM OXALATE 10 MG/1
TABLET ORAL
Qty: 90 TABLET | Refills: 0 | Status: SHIPPED | OUTPATIENT
Start: 2021-10-26

## 2021-10-26 RX ORDER — CLONAZEPAM 0.5 MG/1
0.5 TABLET ORAL 2 TIMES DAILY
Qty: 60 TABLET | Refills: 0 | Status: SHIPPED | OUTPATIENT
Start: 2021-10-26 | End: 2021-11-30

## 2021-10-26 RX ORDER — RIMEGEPANT SULFATE 75 MG/75MG
TABLET, ORALLY DISINTEGRATING ORAL
COMMUNITY
Start: 2021-10-21

## 2021-10-27 ENCOUNTER — OFFICE VISIT (OUTPATIENT)
Dept: AUDIOLOGY | Facility: CLINIC | Age: 36
End: 2021-10-27
Payer: COMMERCIAL

## 2021-10-27 ENCOUNTER — OFFICE VISIT (OUTPATIENT)
Dept: OTOLARYNGOLOGY | Facility: CLINIC | Age: 36
End: 2021-10-27
Payer: COMMERCIAL

## 2021-10-27 VITALS — HEIGHT: 63 IN | TEMPERATURE: 98.7 F | WEIGHT: 125 LBS | BODY MASS INDEX: 22.15 KG/M2

## 2021-10-27 DIAGNOSIS — R42 VERTIGO: Primary | ICD-10-CM

## 2021-10-27 DIAGNOSIS — M54.2 NECK PAIN: ICD-10-CM

## 2021-10-27 DIAGNOSIS — G43.809 OTHER MIGRAINE WITHOUT STATUS MIGRAINOSUS, NOT INTRACTABLE: ICD-10-CM

## 2021-10-27 DIAGNOSIS — R51.9 FACIAL PAIN: ICD-10-CM

## 2021-10-27 DIAGNOSIS — R26.89 IMBALANCE: ICD-10-CM

## 2021-10-27 DIAGNOSIS — H93.13 BILATERAL TINNITUS: ICD-10-CM

## 2021-10-27 DIAGNOSIS — M26.629 TMJ SYNDROME: ICD-10-CM

## 2021-10-27 PROCEDURE — 92557 COMPREHENSIVE HEARING TEST: CPT | Performed by: AUDIOLOGIST

## 2021-10-27 PROCEDURE — 92567 TYMPANOMETRY: CPT | Performed by: AUDIOLOGIST

## 2021-10-27 PROCEDURE — 99244 OFF/OP CNSLTJ NEW/EST MOD 40: CPT | Performed by: NURSE PRACTITIONER

## 2021-10-27 PROCEDURE — 3008F BODY MASS INDEX DOCD: CPT | Performed by: FAMILY MEDICINE

## 2021-10-28 ENCOUNTER — OFFICE VISIT (OUTPATIENT)
Dept: PHYSICAL THERAPY | Facility: CLINIC | Age: 36
End: 2021-10-28
Payer: COMMERCIAL

## 2021-10-28 DIAGNOSIS — G56.21 CUBITAL TUNNEL SYNDROME ON RIGHT: Primary | ICD-10-CM

## 2021-10-28 PROCEDURE — 97110 THERAPEUTIC EXERCISES: CPT

## 2021-10-28 PROCEDURE — 97112 NEUROMUSCULAR REEDUCATION: CPT

## 2021-11-01 ENCOUNTER — OFFICE VISIT (OUTPATIENT)
Dept: PHYSICAL THERAPY | Facility: CLINIC | Age: 36
End: 2021-11-01
Payer: COMMERCIAL

## 2021-11-01 DIAGNOSIS — R51.9 FACIAL PAIN: ICD-10-CM

## 2021-11-01 DIAGNOSIS — R42 VERTIGO: Primary | ICD-10-CM

## 2021-11-01 DIAGNOSIS — M26.629 TMJ SYNDROME: ICD-10-CM

## 2021-11-01 DIAGNOSIS — R26.89 IMBALANCE: ICD-10-CM

## 2021-11-01 DIAGNOSIS — M54.2 NECK PAIN: ICD-10-CM

## 2021-11-01 DIAGNOSIS — G43.001 MIGRAINE WITHOUT AURA AND WITH STATUS MIGRAINOSUS, NOT INTRACTABLE: ICD-10-CM

## 2021-11-01 PROCEDURE — 97530 THERAPEUTIC ACTIVITIES: CPT | Performed by: PHYSICAL THERAPIST

## 2021-11-01 PROCEDURE — 97112 NEUROMUSCULAR REEDUCATION: CPT | Performed by: PHYSICAL THERAPIST

## 2021-11-02 ENCOUNTER — OFFICE VISIT (OUTPATIENT)
Dept: PHYSICAL THERAPY | Facility: CLINIC | Age: 36
End: 2021-11-02
Payer: COMMERCIAL

## 2021-11-02 DIAGNOSIS — G56.21 CUBITAL TUNNEL SYNDROME ON RIGHT: Primary | ICD-10-CM

## 2021-11-02 PROCEDURE — 97110 THERAPEUTIC EXERCISES: CPT

## 2021-11-02 PROCEDURE — 97112 NEUROMUSCULAR REEDUCATION: CPT

## 2021-11-03 ENCOUNTER — OFFICE VISIT (OUTPATIENT)
Dept: PHYSICAL THERAPY | Facility: CLINIC | Age: 36
End: 2021-11-03
Payer: COMMERCIAL

## 2021-11-03 DIAGNOSIS — G56.21 CUBITAL TUNNEL SYNDROME ON RIGHT: Primary | ICD-10-CM

## 2021-11-03 PROCEDURE — 97112 NEUROMUSCULAR REEDUCATION: CPT

## 2021-11-03 PROCEDURE — 97110 THERAPEUTIC EXERCISES: CPT

## 2021-11-05 ENCOUNTER — OFFICE VISIT (OUTPATIENT)
Dept: PHYSICAL THERAPY | Facility: CLINIC | Age: 36
End: 2021-11-05
Payer: COMMERCIAL

## 2021-11-05 DIAGNOSIS — G43.001 MIGRAINE WITHOUT AURA AND WITH STATUS MIGRAINOSUS, NOT INTRACTABLE: ICD-10-CM

## 2021-11-05 DIAGNOSIS — M54.2 NECK PAIN: ICD-10-CM

## 2021-11-05 DIAGNOSIS — M26.629 TMJ SYNDROME: ICD-10-CM

## 2021-11-05 DIAGNOSIS — R26.89 IMBALANCE: ICD-10-CM

## 2021-11-05 DIAGNOSIS — R42 VERTIGO: Primary | ICD-10-CM

## 2021-11-05 PROCEDURE — 97112 NEUROMUSCULAR REEDUCATION: CPT | Performed by: PHYSICAL THERAPIST

## 2021-11-08 ENCOUNTER — OFFICE VISIT (OUTPATIENT)
Dept: PHYSICAL THERAPY | Facility: CLINIC | Age: 36
End: 2021-11-08
Payer: COMMERCIAL

## 2021-11-08 DIAGNOSIS — G56.21 CUBITAL TUNNEL SYNDROME ON RIGHT: Primary | ICD-10-CM

## 2021-11-08 PROCEDURE — 97110 THERAPEUTIC EXERCISES: CPT | Performed by: PHYSICAL THERAPIST

## 2021-11-08 PROCEDURE — 97112 NEUROMUSCULAR REEDUCATION: CPT | Performed by: PHYSICAL THERAPIST

## 2021-11-09 ENCOUNTER — OFFICE VISIT (OUTPATIENT)
Dept: OBGYN CLINIC | Facility: CLINIC | Age: 36
End: 2021-11-09
Payer: COMMERCIAL

## 2021-11-09 ENCOUNTER — OFFICE VISIT (OUTPATIENT)
Dept: PHYSICAL THERAPY | Facility: CLINIC | Age: 36
End: 2021-11-09
Payer: COMMERCIAL

## 2021-11-09 VITALS — SYSTOLIC BLOOD PRESSURE: 120 MMHG | HEART RATE: 71 BPM | TEMPERATURE: 98.6 F | DIASTOLIC BLOOD PRESSURE: 78 MMHG

## 2021-11-09 DIAGNOSIS — R51.9 FACIAL PAIN: ICD-10-CM

## 2021-11-09 DIAGNOSIS — M26.629 TMJ SYNDROME: ICD-10-CM

## 2021-11-09 DIAGNOSIS — G56.21 CUBITAL TUNNEL SYNDROME ON RIGHT: Primary | ICD-10-CM

## 2021-11-09 DIAGNOSIS — M54.2 NECK PAIN: ICD-10-CM

## 2021-11-09 DIAGNOSIS — R26.89 IMBALANCE: ICD-10-CM

## 2021-11-09 DIAGNOSIS — R42 VERTIGO: Primary | ICD-10-CM

## 2021-11-09 PROCEDURE — 99213 OFFICE O/P EST LOW 20 MIN: CPT | Performed by: ORTHOPAEDIC SURGERY

## 2021-11-09 PROCEDURE — 97112 NEUROMUSCULAR REEDUCATION: CPT | Performed by: PHYSICAL THERAPIST

## 2021-11-09 PROCEDURE — 1036F TOBACCO NON-USER: CPT | Performed by: ORTHOPAEDIC SURGERY

## 2021-11-09 PROCEDURE — 97140 MANUAL THERAPY 1/> REGIONS: CPT | Performed by: PHYSICAL THERAPIST

## 2021-11-10 ENCOUNTER — APPOINTMENT (OUTPATIENT)
Dept: PHYSICAL THERAPY | Facility: CLINIC | Age: 36
End: 2021-11-10
Payer: COMMERCIAL

## 2021-11-10 ENCOUNTER — TELEPHONE (OUTPATIENT)
Dept: OBGYN CLINIC | Facility: HOSPITAL | Age: 36
End: 2021-11-10

## 2021-11-11 ENCOUNTER — OFFICE VISIT (OUTPATIENT)
Dept: PHYSICAL THERAPY | Facility: CLINIC | Age: 36
End: 2021-11-11
Payer: COMMERCIAL

## 2021-11-11 DIAGNOSIS — M26.629 TMJ SYNDROME: ICD-10-CM

## 2021-11-11 DIAGNOSIS — G43.001 MIGRAINE WITHOUT AURA AND WITH STATUS MIGRAINOSUS, NOT INTRACTABLE: ICD-10-CM

## 2021-11-11 DIAGNOSIS — R42 VERTIGO: Primary | ICD-10-CM

## 2021-11-11 DIAGNOSIS — M54.2 NECK PAIN: ICD-10-CM

## 2021-11-11 DIAGNOSIS — R51.9 FACIAL PAIN: ICD-10-CM

## 2021-11-11 PROCEDURE — 97112 NEUROMUSCULAR REEDUCATION: CPT | Performed by: PHYSICAL THERAPIST

## 2021-11-11 PROCEDURE — 97140 MANUAL THERAPY 1/> REGIONS: CPT | Performed by: PHYSICAL THERAPIST

## 2021-11-15 ENCOUNTER — OFFICE VISIT (OUTPATIENT)
Dept: PHYSICAL THERAPY | Facility: CLINIC | Age: 36
End: 2021-11-15
Payer: COMMERCIAL

## 2021-11-15 DIAGNOSIS — G56.21 CUBITAL TUNNEL SYNDROME ON RIGHT: Primary | ICD-10-CM

## 2021-11-15 PROCEDURE — 97112 NEUROMUSCULAR REEDUCATION: CPT

## 2021-11-15 PROCEDURE — 97110 THERAPEUTIC EXERCISES: CPT

## 2021-11-16 ENCOUNTER — OFFICE VISIT (OUTPATIENT)
Dept: PHYSICAL THERAPY | Facility: CLINIC | Age: 36
End: 2021-11-16
Payer: COMMERCIAL

## 2021-11-16 DIAGNOSIS — R26.89 IMBALANCE: ICD-10-CM

## 2021-11-16 DIAGNOSIS — M26.629 TMJ SYNDROME: ICD-10-CM

## 2021-11-16 DIAGNOSIS — R42 VERTIGO: Primary | ICD-10-CM

## 2021-11-16 DIAGNOSIS — R51.9 FACIAL PAIN: ICD-10-CM

## 2021-11-16 DIAGNOSIS — G43.001 MIGRAINE WITHOUT AURA AND WITH STATUS MIGRAINOSUS, NOT INTRACTABLE: ICD-10-CM

## 2021-11-16 PROCEDURE — 97112 NEUROMUSCULAR REEDUCATION: CPT | Performed by: PHYSICAL THERAPIST

## 2021-11-17 ENCOUNTER — APPOINTMENT (OUTPATIENT)
Dept: PHYSICAL THERAPY | Facility: CLINIC | Age: 36
End: 2021-11-17
Payer: COMMERCIAL

## 2021-11-18 ENCOUNTER — APPOINTMENT (OUTPATIENT)
Dept: PHYSICAL THERAPY | Facility: CLINIC | Age: 36
End: 2021-11-18
Payer: COMMERCIAL

## 2021-11-22 ENCOUNTER — EVALUATION (OUTPATIENT)
Dept: PHYSICAL THERAPY | Facility: CLINIC | Age: 36
End: 2021-11-22
Payer: COMMERCIAL

## 2021-11-22 DIAGNOSIS — R51.9 FACIAL PAIN: ICD-10-CM

## 2021-11-22 DIAGNOSIS — M26.629 TMJ SYNDROME: Primary | ICD-10-CM

## 2021-11-22 DIAGNOSIS — M54.2 NECK PAIN: ICD-10-CM

## 2021-11-22 DIAGNOSIS — G56.21 CUBITAL TUNNEL SYNDROME ON RIGHT: ICD-10-CM

## 2021-11-22 PROCEDURE — 97110 THERAPEUTIC EXERCISES: CPT

## 2021-11-22 PROCEDURE — 97112 NEUROMUSCULAR REEDUCATION: CPT

## 2021-11-23 ENCOUNTER — OFFICE VISIT (OUTPATIENT)
Dept: PHYSICAL THERAPY | Facility: CLINIC | Age: 36
End: 2021-11-23
Payer: COMMERCIAL

## 2021-11-23 DIAGNOSIS — R42 VERTIGO: Primary | ICD-10-CM

## 2021-11-23 DIAGNOSIS — R51.9 FACIAL PAIN: ICD-10-CM

## 2021-11-23 DIAGNOSIS — R26.89 IMBALANCE: ICD-10-CM

## 2021-11-23 DIAGNOSIS — M26.629 TMJ SYNDROME: ICD-10-CM

## 2021-11-23 DIAGNOSIS — G43.001 MIGRAINE WITHOUT AURA AND WITH STATUS MIGRAINOSUS, NOT INTRACTABLE: ICD-10-CM

## 2021-11-23 DIAGNOSIS — G56.21 CUBITAL TUNNEL SYNDROME ON RIGHT: ICD-10-CM

## 2021-11-23 DIAGNOSIS — M54.2 NECK PAIN: ICD-10-CM

## 2021-11-23 PROCEDURE — 97112 NEUROMUSCULAR REEDUCATION: CPT | Performed by: PHYSICAL THERAPIST

## 2021-11-24 ENCOUNTER — APPOINTMENT (OUTPATIENT)
Dept: PHYSICAL THERAPY | Facility: CLINIC | Age: 36
End: 2021-11-24
Payer: COMMERCIAL

## 2021-11-26 ENCOUNTER — APPOINTMENT (OUTPATIENT)
Dept: PHYSICAL THERAPY | Facility: CLINIC | Age: 36
End: 2021-11-26
Payer: COMMERCIAL

## 2021-11-27 DIAGNOSIS — F41.9 ANXIETY: ICD-10-CM

## 2021-11-29 ENCOUNTER — OFFICE VISIT (OUTPATIENT)
Dept: PHYSICAL THERAPY | Facility: CLINIC | Age: 36
End: 2021-11-29
Payer: COMMERCIAL

## 2021-11-29 DIAGNOSIS — G56.21 CUBITAL TUNNEL SYNDROME ON RIGHT: ICD-10-CM

## 2021-11-29 DIAGNOSIS — M54.2 NECK PAIN: ICD-10-CM

## 2021-11-29 DIAGNOSIS — R51.9 FACIAL PAIN: Primary | ICD-10-CM

## 2021-11-29 DIAGNOSIS — M26.629 TMJ SYNDROME: ICD-10-CM

## 2021-11-29 PROCEDURE — 97140 MANUAL THERAPY 1/> REGIONS: CPT

## 2021-11-29 PROCEDURE — 97112 NEUROMUSCULAR REEDUCATION: CPT

## 2021-11-29 PROCEDURE — 97110 THERAPEUTIC EXERCISES: CPT

## 2021-11-30 ENCOUNTER — APPOINTMENT (OUTPATIENT)
Dept: PHYSICAL THERAPY | Facility: CLINIC | Age: 36
End: 2021-11-30
Payer: COMMERCIAL

## 2021-11-30 RX ORDER — CLONAZEPAM 0.5 MG/1
TABLET ORAL
Qty: 60 TABLET | Refills: 0 | Status: SHIPPED | OUTPATIENT
Start: 2021-11-30

## 2021-12-01 ENCOUNTER — APPOINTMENT (OUTPATIENT)
Dept: PHYSICAL THERAPY | Facility: CLINIC | Age: 36
End: 2021-12-01
Payer: COMMERCIAL

## 2021-12-06 ENCOUNTER — APPOINTMENT (OUTPATIENT)
Dept: PHYSICAL THERAPY | Facility: CLINIC | Age: 36
End: 2021-12-06
Payer: COMMERCIAL

## 2021-12-07 ENCOUNTER — APPOINTMENT (OUTPATIENT)
Dept: PHYSICAL THERAPY | Facility: CLINIC | Age: 36
End: 2021-12-07
Payer: COMMERCIAL

## 2021-12-08 ENCOUNTER — APPOINTMENT (OUTPATIENT)
Dept: PHYSICAL THERAPY | Facility: CLINIC | Age: 36
End: 2021-12-08
Payer: COMMERCIAL

## 2021-12-09 ENCOUNTER — APPOINTMENT (OUTPATIENT)
Dept: PHYSICAL THERAPY | Facility: CLINIC | Age: 36
End: 2021-12-09
Payer: COMMERCIAL

## 2021-12-13 ENCOUNTER — OFFICE VISIT (OUTPATIENT)
Dept: PHYSICAL THERAPY | Facility: CLINIC | Age: 36
End: 2021-12-13
Payer: COMMERCIAL

## 2021-12-13 DIAGNOSIS — M26.629 TMJ SYNDROME: ICD-10-CM

## 2021-12-13 DIAGNOSIS — G56.21 CUBITAL TUNNEL SYNDROME ON RIGHT: ICD-10-CM

## 2021-12-13 DIAGNOSIS — M54.2 NECK PAIN: ICD-10-CM

## 2021-12-13 DIAGNOSIS — R51.9 FACIAL PAIN: Primary | ICD-10-CM

## 2021-12-13 PROCEDURE — 97112 NEUROMUSCULAR REEDUCATION: CPT

## 2021-12-13 PROCEDURE — 97110 THERAPEUTIC EXERCISES: CPT

## 2021-12-14 ENCOUNTER — APPOINTMENT (OUTPATIENT)
Dept: PHYSICAL THERAPY | Facility: CLINIC | Age: 36
End: 2021-12-14
Payer: COMMERCIAL

## 2021-12-15 ENCOUNTER — OFFICE VISIT (OUTPATIENT)
Dept: PHYSICAL THERAPY | Facility: CLINIC | Age: 36
End: 2021-12-15
Payer: COMMERCIAL

## 2021-12-15 DIAGNOSIS — M26.629 TMJ SYNDROME: ICD-10-CM

## 2021-12-15 DIAGNOSIS — R51.9 FACIAL PAIN: Primary | ICD-10-CM

## 2021-12-15 DIAGNOSIS — G56.21 CUBITAL TUNNEL SYNDROME ON RIGHT: ICD-10-CM

## 2021-12-15 DIAGNOSIS — M54.2 NECK PAIN: ICD-10-CM

## 2021-12-15 PROCEDURE — 97112 NEUROMUSCULAR REEDUCATION: CPT

## 2021-12-15 PROCEDURE — 97110 THERAPEUTIC EXERCISES: CPT

## 2021-12-16 ENCOUNTER — APPOINTMENT (OUTPATIENT)
Dept: PHYSICAL THERAPY | Facility: CLINIC | Age: 36
End: 2021-12-16
Payer: COMMERCIAL

## 2021-12-17 ENCOUNTER — PATIENT MESSAGE (OUTPATIENT)
Dept: BEHAVIORAL/MENTAL HEALTH CLINIC | Facility: CLINIC | Age: 36
End: 2021-12-17

## 2021-12-20 ENCOUNTER — APPOINTMENT (OUTPATIENT)
Dept: PHYSICAL THERAPY | Facility: CLINIC | Age: 36
End: 2021-12-20
Payer: COMMERCIAL

## 2021-12-21 ENCOUNTER — APPOINTMENT (OUTPATIENT)
Dept: PHYSICAL THERAPY | Facility: CLINIC | Age: 36
End: 2021-12-21
Payer: COMMERCIAL

## 2021-12-22 ENCOUNTER — APPOINTMENT (OUTPATIENT)
Dept: PHYSICAL THERAPY | Facility: CLINIC | Age: 36
End: 2021-12-22
Payer: COMMERCIAL

## 2021-12-23 ENCOUNTER — APPOINTMENT (OUTPATIENT)
Dept: PHYSICAL THERAPY | Facility: CLINIC | Age: 36
End: 2021-12-23
Payer: COMMERCIAL

## 2021-12-27 ENCOUNTER — APPOINTMENT (OUTPATIENT)
Dept: PHYSICAL THERAPY | Facility: CLINIC | Age: 36
End: 2021-12-27
Payer: COMMERCIAL

## 2021-12-28 ENCOUNTER — APPOINTMENT (OUTPATIENT)
Dept: PHYSICAL THERAPY | Facility: CLINIC | Age: 36
End: 2021-12-28
Payer: COMMERCIAL

## 2021-12-30 ENCOUNTER — APPOINTMENT (OUTPATIENT)
Dept: PHYSICAL THERAPY | Facility: CLINIC | Age: 36
End: 2021-12-30
Payer: COMMERCIAL

## 2022-05-10 ENCOUNTER — TELEPHONE (OUTPATIENT)
Dept: OBGYN CLINIC | Facility: HOSPITAL | Age: 37
End: 2022-05-10

## 2022-05-13 ENCOUNTER — TELEPHONE (OUTPATIENT)
Dept: OBGYN CLINIC | Facility: HOSPITAL | Age: 37
End: 2022-05-13

## 2022-05-18 ENCOUNTER — OFFICE VISIT (OUTPATIENT)
Dept: OBGYN CLINIC | Facility: CLINIC | Age: 37
End: 2022-05-18
Payer: COMMERCIAL

## 2022-05-18 VITALS
BODY MASS INDEX: 27.18 KG/M2 | HEIGHT: 63 IN | DIASTOLIC BLOOD PRESSURE: 84 MMHG | WEIGHT: 153.4 LBS | SYSTOLIC BLOOD PRESSURE: 141 MMHG | HEART RATE: 92 BPM

## 2022-05-18 DIAGNOSIS — G56.21 CUBITAL TUNNEL SYNDROME ON RIGHT: Primary | ICD-10-CM

## 2022-05-18 PROCEDURE — 1036F TOBACCO NON-USER: CPT | Performed by: PHYSICIAN ASSISTANT

## 2022-05-18 PROCEDURE — 99214 OFFICE O/P EST MOD 30 MIN: CPT | Performed by: PHYSICIAN ASSISTANT

## 2022-05-18 PROCEDURE — 3008F BODY MASS INDEX DOCD: CPT | Performed by: PHYSICIAN ASSISTANT

## 2022-05-18 RX ORDER — ONDANSETRON 4 MG/1
4 TABLET, FILM COATED ORAL 3 TIMES DAILY
COMMUNITY
Start: 2022-05-03

## 2022-05-18 NOTE — PROGRESS NOTES
Assessment/Plan:  1  Cubital tunnel syndrome on right  Ambulatory Referral to Physical Therapy     Patient does appear to have recurrent cubital tunnel symptoms at this time  She may have unstable nerve, though I cannot appreciate this on examination today  The patient does appreciate subjective instability of the nerve though  The patient is requesting physical therapy as she responded well to that in the past   I did provide her with a prescription for this today  If she continues with symptoms, we may consider repeat EMG in the future to further assess this  She may require repeat procedure to stabilize the nerve  Obviously cannot perform this right now as the patient is pregnant  She will keep us updated with her progress  Subjective: Chino Ellington is a 29 y o  female who presents toda she notes good range of motion of the elbow   y for follow-up of her right elbow, now almost 11 months status post ulnar nerve transposition  She did suffer with some postoperative stiffness and had responded well to physical therapy for that  She had been doing well up until a couple months ago when she notes she started with paresthesias about the ulnar nerve distribution again as well as some discomfort about the medial elbow  She also notes she feels her nerve moving at times  The patient is currently 5 months pregnant  Review of Systems   Constitutional: Negative  Negative for chills and fever  HENT: Negative  Negative for ear pain and sore throat  Eyes: Negative  Negative for pain and redness  Respiratory: Negative  Negative for shortness of breath and wheezing  Cardiovascular: Negative for chest pain and palpitations  Gastrointestinal: Negative  Negative for abdominal pain and blood in stool  Endocrine: Negative  Negative for polydipsia and polyuria  Genitourinary: Negative  Negative for difficulty urinating and dysuria  Musculoskeletal:        As noted in HPI   Skin: Negative  Negative for pallor and rash  Neurological: Positive for numbness  Negative for dizziness  Hematological: Negative  Negative for adenopathy  Does not bruise/bleed easily  Psychiatric/Behavioral: Negative  Negative for confusion and suicidal ideas  Past Medical History:   Diagnosis Date    Depression     Migraines        Past Surgical History:   Procedure Laterality Date    EPIDURAL BLOCK INJECTION      for chilbirth    NO PAST SURGERIES      [place] REVISE ULNAR NERVE AT ELBOW Right 6/24/2021    Procedure: TRANSPOSITION NERVE ULNAR (RIGHT); Surgeon: Katja Rodriguez MD;  Location: [facility];  Service: Orthopedics       Family History   Problem Relation Age of Onset    No Known Problems Mother     No Known Problems Father        Social History     Occupational History    Not on file   Tobacco Use    Smoking status: Never Smoker    Smokeless tobacco: Never Used   Vaping Use    Vaping Use: Never used   Substance and Sexual Activity    Alcohol use: Not Currently    Drug use: Never    Sexual activity: Not on file         Current Outpatient Medications:     clonazePAM (KlonoPIN) 0 5 mg tablet, TAKE 1 TABLET(0 5 MG) BY MOUTH TWICE DAILY (Patient not taking: Reported on 5/18/2022), Disp: 60 tablet, Rfl: 0    escitalopram (LEXAPRO) 10 mg tablet, TAKE 1 TABLET(10 MG) BY MOUTH DAILY (Patient not taking: Reported on 5/18/2022), Disp: 90 tablet, Rfl: 0    Nurtec 75 MG TBDP, , Disp: , Rfl:     ondansetron (ZOFRAN) 4 mg tablet, Take 4 mg by mouth in the morning and 4 mg in the evening and 4 mg before bedtime  , Disp: , Rfl:     SUMAtriptan (IMITREX) 50 mg tablet, Take 1 tablet (50 mg total) by mouth as needed for migraine, Disp: 9 tablet, Rfl: 0    No Known Allergies    Objective:  Vitals:    05/18/22 1043   BP: 141/84   Pulse: 92       Left Elbow Exam     Tenderness   Left elbow tenderness location: Positive Tinel's over ulnar nerve  No mobility  of the nerve appreciated on examination today  Range of Motion   Extension: normal   Flexion: normal   Pronation: normal   Supination: normal     Other   Erythema: absent  Pulse: present    Comments:  Paresthesias ulnar nerve distribution  Physical Exam  Constitutional:       General: She is not in acute distress  Appearance: She is well-developed  HENT:      Head: Normocephalic and atraumatic  Eyes:      General: No scleral icterus  Conjunctiva/sclera: Conjunctivae normal    Neck:      Vascular: No JVD  Cardiovascular:      Rate and Rhythm: Normal rate  Pulmonary:      Effort: Pulmonary effort is normal  No respiratory distress  Skin:     General: Skin is warm  Neurological:      Mental Status: She is alert and oriented to person, place, and time        Coordination: Coordination normal

## 2022-06-01 ENCOUNTER — EVALUATION (OUTPATIENT)
Dept: OCCUPATIONAL THERAPY | Facility: CLINIC | Age: 37
End: 2022-06-01
Payer: COMMERCIAL

## 2022-06-01 DIAGNOSIS — G56.21 CUBITAL TUNNEL SYNDROME ON RIGHT: Primary | ICD-10-CM

## 2022-06-01 DIAGNOSIS — Z47.89 AFTERCARE FOLLOWING SURGERY OF THE MUSCULOSKELETAL SYSTEM: ICD-10-CM

## 2022-06-01 PROCEDURE — 97110 THERAPEUTIC EXERCISES: CPT

## 2022-06-01 PROCEDURE — 97165 OT EVAL LOW COMPLEX 30 MIN: CPT

## 2022-06-01 NOTE — PROGRESS NOTES
OT Evaluation     Today's date: 2022  Patient name: Shena Whitt  : 1985  MRN: [id number]  Referring provider: Clint Albarran  Dx:   Encounter Diagnosis     ICD-10-CM    1  Cubital tunnel syndrome on right  G56 21 Ambulatory Referral to Physical Therapy     OT Plan of Care Cert/Re-cert   2  Aftercare following surgery of the musculoskeletal system  [id number] Ambulatory referral to PT/OT hand therapy     OT Plan of Care Cert/Re-cert       Start Time: 7  Stop Time: 1700  Total time in clinic (min): 45 minutes     Insurance:  AMA/CMS Eval/ Re-eval POC expires FOTO Auth Status Total   Visits  Start date  Expiration date Extension  Visit limitation? PT only or  PT+OT? Co-Insurance   AMA   PENDING     60 PT + OT $49 32 Co-pay                                                                 AUTH Status: PENDING Date               Visits  Authed:  Used                Remaining                      Assessment  Assessment details: SKILLED ANALYSIS:  Pt demonstrating cubital tunnel and seeking OT services  Pt requires A with IADLs including cooking, cleaning and child rearing and working tasks and would benefit from OT services 2 times per week  Pt is currently demonstrating the following occupational deficits: limited 2* decreased UE strength, numbness/tingliness, hand strength  Pt would benefit from OT outpatient services  Impairments: abnormal muscle tone, abnormal or restricted ROM, activity intolerance, impaired physical strength, lacks appropriate home exercise program, pain with function and weight-bearing intolerance  Understanding of Dx/Px/POC: good   Prognosis: good    Goals  Short term goals:  1  Patient to demonstrate understanding of home exercise program in 2 weeks for decreased pain with activities of daily living  2  Patient to demonstrate increased active range of motion of elbow to 138 degrees in 4 weeks to aid in cooking  3   Patient to demonstrate increased  strength by 3 lbs in 4 weeks to increase  on full cup  4  Patient to report a decrease in pain by at least 1 point on a 0-10 scale in 2 weeks to aid in dressing        Long term goals:  1  Patient to demonstrate functional active range of motion for independent ADL's by time of discharge  2  Patient to demonstrate functional strength for independent ADL's by time of discharge  3  Patient to demonstrate understanding of final discharge home exercise program by time of discharge    Plan  Patient would benefit from: OT eval and skilled occupational therapy  Planned modality interventions: ultrasound and thermotherapy: hydrocollator packs  Planned therapy interventions: IADL retraining, activity modification, joint mobilization, manual therapy, strengthening, stretching, therapeutic activities, fine motor coordination training, therapeutic exercise, therapeutic training, flexibility, functional ROM exercises, graded activity, graded exercise, graded motor, home exercise program, patient education and motor coordination training  Frequency: 2x week  Plan of Care beginning date: 2022  Plan of Care expiration date: 2022  Treatment plan discussed with: patient        Subjective Evaluation    History of Present Illness  Mechanism of injury: surgery and trauma  Mechanism of injury: Cubital tunnel symptoms starting 1-2 months ago; had sx 1 year ago - cubital tunnel transposition on RUE  Pain  Current pain ratin  At best pain rating: 3  At worst pain ratin  Quality: dull ache    Hand dominance: right  Life stress: pt reports 2 young kids at home 8 and 6 years;  Pt is an ; having difficulty writing, pt reports difficulty with carrying shopping bags, IADLs for dishes/cleaning, enjoys yoga, hiking, arts and crafts with family     Treatments  Previous treatment: physical therapy  Patient Goals  Patient goals for therapy: decreased pain and increased strength  Patient goal: "to be able to increase strength in hadn and be able to do my everyday "         Objective     Active Range of Motion     Left Elbow   Flexion: 139 degrees   Extension: 0 degrees   Forearm supination: 80 degrees   Forearm pronation: 80 degrees     Right Elbow   Flexion: 133 degrees   Extension: 0 degrees   Forearm supination: 88 degrees   Forearm pronation: 80 degrees     Left Wrist   Wrist flexion: 65 degrees   Wrist extension: 76 degrees   Radial deviation: 24 degrees   Ulnar deviation: 36 degrees     Right Wrist   Wrist flexion: 62 degrees   Wrist extension: 65 degrees   Radial deviation: 20 degrees   Ulnar deviation: 32 degrees     Strength/Myotome Testing     Left Elbow   Flexion: 5  Extension: 5  Forearm supination: 5  Forearm pronation: 5    Right Elbow   Flexion: 3+  Extension: 3+  Forearm supination: 3+  Forearm pronation: 3+    Left Wrist/Hand      (2nd hand position)     Trial 1: 66    Right Wrist/Hand      (2nd hand position)     Trial 1: 35    Additional Strength Details  L UE elbow extension  64  RUE elbow extension 40             Precautions:STANDARD        Manuals HEP                   Ther Ex     Wall slides X 10    Ulnar nerve glides X 10     Elbow flexion X 10    Chest press x10    tricep extension  x10              Ther Activity                                   Modalities     MH 5 min             Assessment:   Patient tolerated session well  Patient session focused on patient education on anatomy and physiology concerning current dx, techniques for decreasing deficits through HEP, and appropriate use of modalities  Patient educated on HEP with verbal instructions and handouts for patient reference  Patient educated on treatment plan at this time  Patient benefiting from skilled hand therapy OT to reduce deficits to improve independence with daily activities  Plan:   Focus on AROM to improve ability to complete daily activites with ease

## 2022-06-06 ENCOUNTER — OFFICE VISIT (OUTPATIENT)
Dept: OCCUPATIONAL THERAPY | Facility: CLINIC | Age: 37
End: 2022-06-06
Payer: COMMERCIAL

## 2022-06-06 DIAGNOSIS — G56.21 CUBITAL TUNNEL SYNDROME ON RIGHT: Primary | ICD-10-CM

## 2022-06-06 PROCEDURE — 97530 THERAPEUTIC ACTIVITIES: CPT

## 2022-06-06 NOTE — PROGRESS NOTES
Daily Note     Today's date: 2022  Patient name: Ban Merino  : 1985  MRN: [id number]  Referring provider: Elza York  Dx:   Encounter Diagnosis   Name Primary?  Cubital tunnel syndrome on right Yes                Insurance:  AMA/CMS Eval/ Re-eval POC expires FOTO Auth Status Total   Visits  Start date  Expiration date Extension  Visit limitation? PT only or  PT+OT? Co-Insurance   AMA    PENDING        60 PT + OT $49 32 Co-pay                                                                                                                    AUTH Status: 60 Date                           Visits  Authed:  Used  2                           Remaining   58                                Visit 2 of   PN due   POC valid     Subjective: pt reports HEP is going well  Objective: See treatment below  Manuals HEP                   Ther Ex     Wall slides X 10    Ulnar nerve glides X 10  X 10    Elbow flexion/extension with/without supination  X 10 X 15 reps    Wrist flexion/extension/RD/UD  X 15 reps    PROM wrist flexion/ext  3 x 10 second holds   Pronation/supination   Cane using 1 lb         Ther Activity     juxtacisor  X 5 reps   Iron balls  X 15 reps                       Modalities     MH 5 min               Assessment: Tolerated treatment well  Pt demonstrating in session slight tingliness with strengthening exercises  Patient tolerated light strengthening exercises well today  Pt would benefit from continued OT services to address UE strength and ROM to optimize independence in IADLs and working tasks  Plan: Continued skilled OT per POC

## 2022-06-08 ENCOUNTER — OFFICE VISIT (OUTPATIENT)
Dept: OCCUPATIONAL THERAPY | Facility: CLINIC | Age: 37
End: 2022-06-08
Payer: COMMERCIAL

## 2022-06-08 DIAGNOSIS — G56.21 CUBITAL TUNNEL SYNDROME ON RIGHT: Primary | ICD-10-CM

## 2022-06-08 PROCEDURE — 97110 THERAPEUTIC EXERCISES: CPT

## 2022-06-08 PROCEDURE — 97530 THERAPEUTIC ACTIVITIES: CPT

## 2022-06-08 NOTE — PROGRESS NOTES
Daily Note     Today's date: 2022  Patient name: Chino Ellington  : 1985  MRN: [id number]  Referring provider: Cloyce Kayser  Dx:   Encounter Diagnosis   Name Primary?  Cubital tunnel syndrome on right Yes                Insurance:  AMA/CMS Eval/ Re-eval POC expires FOTO Auth Status Total   Visits  Start date  Expiration date Extension  Visit limitation? PT only or  PT+OT? Co-Insurance   AMA    PENDING        60 PT + OT $49 32 Co-pay                                                                                                                    AUTH Status: 60 Date                         Visits  Authed:  Used  2  1                         Remaining   [address]                              Visit 3 of   PN due   POC valid     Subjective: pt reports increased soreness post last session  Objective: See treatment below  Manuals HEP                   Ther Ex     Wall slides X 10    pec stretch X 10  X 5 10 second holds    Elbow flexion/extension with/without supination  X 10 X 15 reps    Wrist flexion/extension/RD/UD  X 15 reps using 1 lb wrist weight    PROM wrist flexion/ext  3 x 10 second holds   Pronation/supination   X 15 reps    Digit extension   X 15 reps with red    Ther Activity     juxtacisor  X 8 reps   Iron balls  X 15 reps   Tendon glides   X 15   Towel scrunches  X 8    Gripper  X 15        Modalities     MH 5 min 5 minutes              Assessment: Tolerated treatment well  Pt demonstrating in session slight tingliness with strengthening exercises  Patient tolerated light strengthening exercises well today  Pt tolerated new exercises with good tolerance and was able to tolerate breakdown of tendon glides with good effect  Decreased strengthening exercises at elbow secondary to soreness  Pt would benefit from continued OT services to address UE strength and ROM to optimize independence in IADLs and working tasks  Plan: Continued skilled OT per POC

## 2022-06-15 ENCOUNTER — APPOINTMENT (OUTPATIENT)
Dept: OCCUPATIONAL THERAPY | Facility: CLINIC | Age: 37
End: 2022-06-15
Payer: COMMERCIAL

## 2022-06-20 ENCOUNTER — OFFICE VISIT (OUTPATIENT)
Dept: OCCUPATIONAL THERAPY | Facility: CLINIC | Age: 37
End: 2022-06-20
Payer: COMMERCIAL

## 2022-06-20 DIAGNOSIS — G56.21 CUBITAL TUNNEL SYNDROME ON RIGHT: Primary | ICD-10-CM

## 2022-06-20 PROCEDURE — 97110 THERAPEUTIC EXERCISES: CPT

## 2022-06-20 PROCEDURE — 97530 THERAPEUTIC ACTIVITIES: CPT

## 2022-06-20 NOTE — PROGRESS NOTES
Daily Note     Today's date: 2022  Patient name: Radha Santana  : 1985  MRN: [id number]  Referring provider: Sanjiv Aguirre  Dx:   Encounter Diagnosis   Name Primary?  Cubital tunnel syndrome on right Yes                Insurance:  AMA/CMS Eval/ Re-eval POC expires FOTO Auth Status Total   Visits  Start date  Expiration date Extension  Visit limitation? PT only or  PT+OT? Co-Insurance   AMA    PENDING        60 PT + OT $49 32 Co-pay                                                                                                                    AUTH Status: 60 Date                         Visits  Authed:  Used  2  1                         Remaining   [address]                              Visit 4 of   PN due   POC valid     Subjective: pt reports increased soreness post last session  Objective: See treatment below  Manuals HEP                   Ther Ex     Supination/pronation X 10 X 20reps using 1lb   pec stretch X 10     Elbow flexion/extension with/without supination  X 10 X 15 reps  Using 1lb   Wrist flexion/extension/RD/UD  X 20 reps using 1 lb wrist weight    PROM wrist flexion/ext  3 x 10 second holds   Pronation/supination   X 15 reps    Digit extension   X 15 reps with red    Ther Activity     juxtacisor  X 8 reps   Theraputty  8-9 minutes   Tendon glides   X 15   Towel scrunches  X 8    Gripper  X 15   IR/ER  X 15 reps         Modalities     Moist heat  5 minutes          Assessment: Tolerated treatment well  Pt demonstrating in session slight tingliness with strengthening exercises  Patient tolerated light strengthening exercises well today  Pt tolerated 3-4 new exercises with good tolerance  Provided with HEP of elbow strengthening  Pt would benefit from continued OT services to address UE strength and ROM to optimize independence in IADLs and working tasks  Plan: Continued skilled OT per POC

## 2022-06-21 ENCOUNTER — OFFICE VISIT (OUTPATIENT)
Dept: OBGYN CLINIC | Facility: CLINIC | Age: 37
End: 2022-06-21
Payer: COMMERCIAL

## 2022-06-21 VITALS
BODY MASS INDEX: 26.58 KG/M2 | HEIGHT: 63 IN | HEART RATE: 80 BPM | DIASTOLIC BLOOD PRESSURE: 74 MMHG | WEIGHT: 150 LBS | SYSTOLIC BLOOD PRESSURE: 132 MMHG

## 2022-06-21 DIAGNOSIS — G56.21 CUBITAL TUNNEL SYNDROME ON RIGHT: Primary | ICD-10-CM

## 2022-06-21 PROCEDURE — 99213 OFFICE O/P EST LOW 20 MIN: CPT | Performed by: ORTHOPAEDIC SURGERY

## 2022-06-21 PROCEDURE — 3008F BODY MASS INDEX DOCD: CPT | Performed by: ORTHOPAEDIC SURGERY

## 2022-06-21 PROCEDURE — 1036F TOBACCO NON-USER: CPT | Performed by: ORTHOPAEDIC SURGERY

## 2022-06-21 NOTE — PROGRESS NOTES
Assessment/Plan:  1  Cubital tunnel syndrome on right         Scribe Attestation    I,:  Barbie Rosario MA am acting as a scribe while in the presence of the attending physician :       I,:  Cale Bailey MD personally performed the services described in this documentation    as scribed in my presence :             I discussed with Shirlene Garcia that her ulnar nerve is unstable  The ulnar nerve does sublux on exam today  She does have decreased sensation over the ulnar nerve distrubution  She does have good strength on exam today  Patient is currently pregnant  We did discuss possible surgical intervention however, we will hold off on this at this time until she gives birth  I did explain, it would be beneficial to undergo surgical intervention earlier on after birth  Patient can continue with formal therapy and physician directed HEP  She may follow up with me as needed  Subjective: Ashleigh Brennan is a 40 y o  female who presents to the office today for follow up evaluation of her right elbow  Patient is appx one year status post right ulnar nerve transposition performed on 6/24/21  Patient states she began to notice a recurrence in her symptoms appx 2 months ago  Patient states she was hiking and did fait and is unsure if she injured her right elbow at that time  Patient notes pain to the medial aspect of her elbow  She states the pain does radiate down and up her arm  She notes constant numbness and tingling to her right small and ring fingers  She notes increased numbness at night  She does sleep with the elbow hyperflexed  She states this does wake her from sleep at night  Patient states she does feel the nerve subluxing  Patient is current pregnant and is due in October  She was evaluated by my physician assistant Jodi ordoñezx 1 month ago and was referred to formal therapy  She has been attending formal therapy without mild relief         Review of Systems   Constitutional: Negative for chills and fever    HENT: Negative for drooling and sneezing  Eyes: Negative for redness  Respiratory: Negative for cough and wheezing  Gastrointestinal: Negative for nausea and vomiting  Musculoskeletal: Negative for arthralgias, joint swelling and myalgias  Neurological: Negative for weakness and numbness  Psychiatric/Behavioral: Negative for behavioral problems  The patient is not nervous/anxious  Past Medical History:   Diagnosis Date    Depression     Migraines        Past Surgical History:   Procedure Laterality Date    EPIDURAL BLOCK INJECTION      for chilbirth    NO PAST SURGERIES      VT REVISE ULNAR NERVE AT ELBOW Right 6/24/2021    Procedure: TRANSPOSITION NERVE ULNAR (RIGHT); Surgeon: Sunshine Shirley MD;  Location: [facility];  Service: Orthopedics       Family History   Problem Relation Age of Onset    No Known Problems Mother     No Known Problems Father        Social History     Occupational History    Not on file   Tobacco Use    Smoking status: Never Smoker    Smokeless tobacco: Never Used   Vaping Use    Vaping Use: Never used   Substance and Sexual Activity    Alcohol use: Not Currently    Drug use: Never    Sexual activity: Not on file         Current Outpatient Medications:     clonazePAM (KlonoPIN) 0 5 mg tablet, TAKE 1 TABLET(0 5 MG) BY MOUTH TWICE DAILY (Patient not taking: Reported on 5/18/2022), Disp: 60 tablet, Rfl: 0    escitalopram (LEXAPRO) 10 mg tablet, TAKE 1 TABLET(10 MG) BY MOUTH DAILY (Patient not taking: Reported on 5/18/2022), Disp: 90 tablet, Rfl: 0    Nurtec 75 MG TBDP, , Disp: , Rfl:     ondansetron (ZOFRAN) 4 mg tablet, Take 4 mg by mouth in the morning and 4 mg in the evening and 4 mg before bedtime  , Disp: , Rfl:     SUMAtriptan (IMITREX) 50 mg tablet, Take 1 tablet (50 mg total) by mouth as needed for migraine, Disp: 9 tablet, Rfl: 0    No Known Allergies    Objective:  Vitals:    06/21/22 1540   BP: 132/74   Pulse: 80       Right Elbow Exam     Range of Motion   Extension: 0   Flexion: 130     Other   Erythema: absent  Sensation: decreased (decreased sensation ulnar nerve distrbution )  Pulse: present    Comments:  Subluxation of the ulnar nerve   5/5 1st dorsal interossei   5/5 APB  5/5 EPL and FPL            Physical Exam  Constitutional:       Appearance: She is well-developed  HENT:      Head: Normocephalic and atraumatic  Eyes:      General:         Right eye: No discharge  Left eye: No discharge  Conjunctiva/sclera: Conjunctivae normal    Cardiovascular:      Rate and Rhythm: Normal rate  Pulmonary:      Effort: Pulmonary effort is normal  No respiratory distress  Musculoskeletal:      Cervical back: Normal range of motion and neck supple  Comments: As noted in HPI   Skin:     General: Skin is warm and dry  Neurological:      Mental Status: She is alert and oriented to person, place, and time  Psychiatric:         Behavior: Behavior normal          Thought Content:  Thought content normal          Judgment: Judgment normal

## (undated) DEVICE — TIBURON HAND DRAPE: Brand: CONVERTORS

## (undated) DEVICE — PACK GENERAL LF

## (undated) DEVICE — VIOLET BRAIDED (POLYGLACTIN 910), SYNTHETIC ABSORBABLE SUTURE: Brand: COATED VICRYL

## (undated) DEVICE — SYRINGE 10ML LL CONTROL TOP

## (undated) DEVICE — PAD CAST 4 IN COTTON NON STERILE

## (undated) DEVICE — LABEL MEDICATION STERILE 2 YELLOW LIDOCAINE 2 BLUE MARCAINE 2 ORANGE HEPARIN

## (undated) DEVICE — SUT VICRYL 3-0 SH 27 IN J416H

## (undated) DEVICE — GLOVE SRG BIOGEL 6.5

## (undated) DEVICE — SPONGE GAUZE 4 X 8 12 PLY STRL LF

## (undated) DEVICE — FABRIC REINFORCED, SURGICAL GOWN, XL: Brand: CONVERTORS

## (undated) DEVICE — ACE WRAP 6 IN UNSTERILE

## (undated) DEVICE — NEEDLE 25G X 1 1/2

## (undated) DEVICE — STRL PENROSE DRAIN 18" X 1/4": Brand: CARDINAL HEALTH

## (undated) DEVICE — ACE WRAP 2 IN UNSTERILE

## (undated) DEVICE — STOCKINETTE,IMPERVIOUS,12X48,STERILE: Brand: MEDLINE

## (undated) DEVICE — HALF SHEET: Brand: CONVERTORS

## (undated) DEVICE — SUT VICRYL 4-0 PS-2 18 IN J496G

## (undated) DEVICE — PADDING CAST 4 IN  COTTON STRL

## (undated) DEVICE — INTENDED FOR TISSUE SEPARATION, AND OTHER PROCEDURES THAT REQUIRE A SHARP SURGICAL BLADE TO PUNCTURE OR CUT.: Brand: BARD-PARKER SAFETY BLADES SIZE 15, STERILE

## (undated) DEVICE — CHLORAPREP HI-LITE 26ML ORANGE

## (undated) DEVICE — GLOVE SRG BIOGEL 7.5

## (undated) DEVICE — ADHESIVE SKN CLSR HISTOACRYL FLEX 0.5ML LF

## (undated) DEVICE — COBAN 4 IN STERILE

## (undated) DEVICE — GLOVE INDICATOR PI UNDERGLOVE SZ 7.5 BLUE

## (undated) DEVICE — BANDAGE, ESMARK LF STR 4"X9'(20/CS): Brand: CYPRESS

## (undated) DEVICE — GLOVE INDICATOR PI UNDERGLOVE SZ 6.5 BLUE

## (undated) DEVICE — ARM SLING: Brand: DEROYAL

## (undated) DEVICE — PLASTER ROLL SPLINT 4 X 260 IN XFAST SET

## (undated) DEVICE — ACE WRAP 4 IN UNSTERILE

## (undated) DEVICE — ADHESIVE SKIN HIGH VISCOSITY EXOFIN 1ML

## (undated) DEVICE — SUT VICRYL PLUS 0 CT-3 27 IN VCP329H